# Patient Record
Sex: FEMALE | Race: BLACK OR AFRICAN AMERICAN | NOT HISPANIC OR LATINO | Employment: OTHER | URBAN - METROPOLITAN AREA
[De-identification: names, ages, dates, MRNs, and addresses within clinical notes are randomized per-mention and may not be internally consistent; named-entity substitution may affect disease eponyms.]

---

## 2023-02-16 PROBLEM — F22 PARANOID DELUSION (MULTI): Status: ACTIVE | Noted: 2023-02-16

## 2023-02-16 PROBLEM — E55.9 MILD VITAMIN D DEFICIENCY: Status: ACTIVE | Noted: 2023-02-16

## 2023-02-16 PROBLEM — R41.3 MEMORY LOSS: Status: ACTIVE | Noted: 2023-02-16

## 2023-02-16 PROBLEM — F32.0 DEPRESSION, MAJOR, SINGLE EPISODE, MILD (CMS-HCC): Status: ACTIVE | Noted: 2023-02-16

## 2023-02-16 PROBLEM — F02.818 DEMENTIA ASSOCIATED WITH OTHER UNDERLYING DISEASE WITH BEHAVIORAL DISTURBANCE (MULTI): Status: ACTIVE | Noted: 2023-02-16

## 2023-02-16 PROBLEM — E87.6 HYPOKALEMIA: Status: ACTIVE | Noted: 2023-02-16

## 2023-02-16 PROBLEM — R21 RASH: Status: ACTIVE | Noted: 2023-02-16

## 2023-02-16 PROBLEM — M54.2 NECK PAIN: Status: ACTIVE | Noted: 2023-02-16

## 2023-02-16 PROBLEM — G30.9 ALZHEIMER'S DISEASE (MULTI): Status: ACTIVE | Noted: 2023-02-16

## 2023-02-16 PROBLEM — E87.6 LOW SERUM POTASSIUM: Status: ACTIVE | Noted: 2023-02-16

## 2023-02-16 PROBLEM — R73.09 ABNORMAL BLOOD SUGAR: Status: ACTIVE | Noted: 2023-02-16

## 2023-02-16 PROBLEM — E43 SEVERE PROTEIN-CALORIE MALNUTRITION (MULTI): Status: ACTIVE | Noted: 2023-02-16

## 2023-02-16 PROBLEM — I10 HYPERTENSION: Status: ACTIVE | Noted: 2023-02-16

## 2023-02-16 PROBLEM — F02.80 ALZHEIMER'S DISEASE (MULTI): Status: ACTIVE | Noted: 2023-02-16

## 2023-02-16 PROBLEM — E78.00 ELEVATED LDL CHOLESTEROL LEVEL: Status: ACTIVE | Noted: 2023-02-16

## 2023-02-16 RX ORDER — AMLODIPINE BESYLATE 5 MG/1
1 TABLET ORAL
COMMUNITY
Start: 2022-07-26 | End: 2023-05-08 | Stop reason: SDUPTHER

## 2023-02-16 RX ORDER — MEMANTINE HYDROCHLORIDE 10 MG/1
1 TABLET ORAL 2 TIMES DAILY
COMMUNITY
Start: 2019-04-22 | End: 2023-05-08 | Stop reason: SDUPTHER

## 2023-02-16 RX ORDER — POTASSIUM CHLORIDE 20 MEQ/1
1 TABLET, EXTENDED RELEASE ORAL
COMMUNITY
Start: 2022-07-26 | End: 2023-03-14 | Stop reason: SINTOL

## 2023-02-16 RX ORDER — ROSUVASTATIN CALCIUM 10 MG/1
1 TABLET, COATED ORAL
COMMUNITY
Start: 2018-04-06 | End: 2023-05-08 | Stop reason: SDUPTHER

## 2023-02-16 RX ORDER — DONEPEZIL HYDROCHLORIDE 10 MG/1
1 TABLET, FILM COATED ORAL
COMMUNITY
Start: 2019-01-30 | End: 2023-05-08 | Stop reason: SDUPTHER

## 2023-03-14 ENCOUNTER — OFFICE VISIT (OUTPATIENT)
Dept: PRIMARY CARE | Facility: CLINIC | Age: 73
End: 2023-03-14
Payer: MEDICARE

## 2023-03-14 VITALS
WEIGHT: 104 LBS | HEIGHT: 63 IN | SYSTOLIC BLOOD PRESSURE: 119 MMHG | BODY MASS INDEX: 18.43 KG/M2 | HEART RATE: 66 BPM | DIASTOLIC BLOOD PRESSURE: 80 MMHG

## 2023-03-14 VITALS — HEART RATE: 106 BPM | DIASTOLIC BLOOD PRESSURE: 80 MMHG | SYSTOLIC BLOOD PRESSURE: 119 MMHG

## 2023-03-14 DIAGNOSIS — E87.6 LOW SERUM POTASSIUM: ICD-10-CM

## 2023-03-14 DIAGNOSIS — F02.80 ALZHEIMER'S DISEASE (MULTI): ICD-10-CM

## 2023-03-14 DIAGNOSIS — I10 PRIMARY HYPERTENSION: Primary | ICD-10-CM

## 2023-03-14 DIAGNOSIS — F32.0 DEPRESSION, MAJOR, SINGLE EPISODE, MILD (CMS-HCC): ICD-10-CM

## 2023-03-14 DIAGNOSIS — Z74.09 IMPAIRED FUNCTIONAL MOBILITY, BALANCE, GAIT, AND ENDURANCE: ICD-10-CM
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
## 2023-03-14 DIAGNOSIS — G30.9 ALZHEIMER'S DISEASE (MULTI): ICD-10-CM

## 2023-03-14 DIAGNOSIS — E43 SEVERE PROTEIN-CALORIE MALNUTRITION (MULTI): ICD-10-CM

## 2023-03-14 DIAGNOSIS — F02.818 DEMENTIA ASSOCIATED WITH OTHER UNDERLYING DISEASE WITH BEHAVIORAL DISTURBANCE (MULTI): ICD-10-CM

## 2023-03-14 PROCEDURE — 99496 TRANSJ CARE MGMT HIGH F2F 7D: CPT | Performed by: FAMILY MEDICINE

## 2023-03-14 PROCEDURE — 1159F MED LIST DOCD IN RCRD: CPT | Performed by: FAMILY MEDICINE

## 2023-03-14 PROCEDURE — 3074F SYST BP LT 130 MM HG: CPT | Performed by: FAMILY MEDICINE

## 2023-03-14 PROCEDURE — 3079F DIAST BP 80-89 MM HG: CPT | Performed by: FAMILY MEDICINE

## 2023-03-14 RX ORDER — POTASSIUM CHLORIDE 1.5 G/1.58G
20 POWDER, FOR SOLUTION ORAL 2 TIMES DAILY
Qty: 60 PACKET | Refills: 11 | Status: SHIPPED | OUTPATIENT
Start: 2023-03-14 | End: 2023-06-16 | Stop reason: SDUPTHER

## 2023-03-14 NOTE — PROGRESS NOTES
"Subjective   Patient ID: Juliann Bautista is a 72 y.o. female who presents for Hospital Follow-up (Fell downstairs was in nursing home   2023).  Today she is accompanied by accompanied by spouse.     Patient: Juliann Bautista  : 1950  PCP: Andrew Cagle MD  MRN: [id number]  Program: No linked episodes     Juliann Bautista is a 72 y.o. female presenting today for follow-up after being discharged from the hospital 7 days ago. The main problem requiring admission was fall down the starirs. The discharge summary and/or Transitional Care Management documentation was reviewed. Medication reconciliation was performed as indicated via the \"Caesar as Reviewed\" timestamp.     Juliann Bautista was contacted by Transitional Care Management services two days after her discharge. This encounter and supporting documentation was reviewed.    The complexity of medical decision making for this patient's transitional care is high.    Review of Systems    No family history on file.    No data recorded    No follow-ups on file.      JULIANN BAUTISTA is a 72 year old Female with past medical history of Alzheimer's, hypertension, hyperlipidemia, chronic malnutrition presents after falling down  14 stairs at home.  She is unable to provide any history, baseline is alert and oriented x0-1.  Her  is at bedside, who provides all history.  Per , they were in the living room, when he got up to grab something from another room, heard  a crash, and ran back, only to find her at the bottom of the basement stairs, which is right next to the living room.   believes that she tripped and fell down the stairs.  There was no loss of consciousness.  She was not complaining of any pain  after the injury initially.  She was brought to the [facility], where CT head/cspine/chest/abd/pelvis and XR of hand were all negative for  fractures.  She has a small right scalp hematoma.  's biggest concern is that over the last several " months, patient has lost interest in food or fluid intake.  He tries to encourage her to eat and drink, and has been giving her supplemental drinks,  but she often does not drink everything he would like her to.  Patient does not voice any concerns.  Per ED, patient was unable to ambulate.  Patient was sent to [facility] rehab patient was discharged from the [facility] last Wednesday patient states that has been was there every day and was providing majority of the care patient was receiving physical therapy is eating much better now       ROS . 14 systems reviewed and negative except as in HPI . Hospital and nursing notes reviewed.  History obtained from  patient unable to provide history very confused  Current Outpatient Medications on File Prior to Visit   Medication Sig Dispense Refill    amLODIPine (Norvasc) 5 mg tablet Take 1 tablet (5 mg) by mouth once every day.      donepezil (Aricept) 10 mg tablet Take 1 tablet (10 mg) by mouth once every day.      memantine (Namenda) 10 mg tablet Take 1 tablet (10 mg) by mouth in the morning and 1 tablet (10 mg) before bedtime.      MULTIVITAMIN ORAL Take 1 tablet by mouth once every day.      rosuvastatin (Crestor) 10 mg tablet Take 1 tablet (10 mg) by mouth once every day.      [DISCONTINUED] potassium chloride CR (Klor-Con M20) 20 mEq ER tablet Take 1 tablet (20 mEq) by mouth once every day.       No current facility-administered medications on file prior to visit.        Review of Systems    Objective   /80   Pulse 106   BSA: There is no height or weight on file to calculate BSA.  Growth percentiles: Facility age limit for growth %rosalva is 20 years. Facility age limit for growth %rosalva is 20 years.   No visits with results within 1 Week(s) from this visit.   Latest known visit with results is:   Legacy Encounter on 07/25/2022   Component Date Value Ref Range Status    Cholesterol 07/25/2022 133  0 - 199 mg/dL Final    Comment: .      AGE       DESIRABLE   BORDERLINE HIGH   HIGH     0-19 Y     0 - 169       170 - 199     >/= 200    20-24 Y     0 - 189       190 - 224     >/= 225         >24 Y     0 - 199       200 - 239     >/= 240   **All ranges are based on fasting samples. Specific   therapeutic targets will vary based on patient-specific   cardiac risk.  .   Pediatric guidelines reference:Pediatrics 2011, 128(S5).   Adult guidelines reference: NCEP ATPIII Guidelines,     TOM 2001, 258:2486-97  .   Venipuncture immediately after or during the    administration of Metamizole may lead to falsely   low results. Testing should be performed immediately   prior to Metamizole dosing.      HDL 07/25/2022 48.7  mg/dL Final    Comment: .      AGE      VERY LOW   LOW     NORMAL    HIGH       0-19 Y       < 35   < 40     40-45     ----    20-24 Y       ----   < 40       >45     ----      >24 Y       ----   < 40     40-60      >60  .      Cholesterol/HDL Ratio 07/25/2022 2.7   Final    Comment: REF VALUES  DESIRABLE  < 3.4  HIGH RISK  > 5.0      LDL 07/25/2022 70  0 - 99 mg/dL Final    Comment: .                           NEAR      BORD      AGE      DESIRABLE  OPTIMAL    HIGH     HIGH     VERY HIGH     0-19 Y     0 - 109     ---    110-129   >/= 130     ----    20-24 Y     0 - 119     ---    120-159   >/= 160     ----      >24 Y     0 -  99   100-129  130-159   160-189     >/=190  .      VLDL 07/25/2022 15  0 - 40 mg/dL Final    Triglycerides 07/25/2022 74  0 - 149 mg/dL Final    Comment: .      AGE      DESIRABLE   BORDERLINE HIGH   HIGH     VERY HIGH   0 D-90 D    19 - 174         ----         ----        ----  91 D- 9 Y     0 -  74        75 -  99     >/= 100      ----    10-19 Y     0 -  89        90 - 129     >/= 130      ----    20-24 Y     0 - 114       115 - 149     >/= 150      ----         >24 Y     0 - 149       150 - 199    200- 499    >/= 500  .   Venipuncture immediately after or during the    administration of Metamizole may lead to falsely   low  results. Testing should be performed immediately   prior to Metamizole dosing.      Vitamin B-12 07/25/2022 727  211 - 911 pg/mL Final    Glucose 07/25/2022 107 (H)  74 - 99 mg/dL Final    Sodium 07/25/2022 141  136 - 145 mmol/L Final    Potassium 07/25/2022 3.0 (L)  3.5 - 5.3 mmol/L Final    Chloride 07/25/2022 101  98 - 107 mmol/L Final    Bicarbonate 07/25/2022 29  21 - 32 mmol/L Final    Anion Gap 07/25/2022 14  10 - 20 mmol/L Final    Urea Nitrogen 07/25/2022 9  6 - 23 mg/dL Final    Creatinine 07/25/2022 0.81  0.50 - 1.05 mg/dL Final    GFR Female 07/25/2022 77  >90 mL/min/1.73m2 Final    Comment:  CALCULATIONS OF ESTIMATED GFR ARE PERFORMED   USING THE 2021 CKD-EPI STUDY REFIT EQUATION   WITHOUT THE RACE VARIABLE FOR THE IDMS-TRACEABLE   CREATININE METHODS.    https://jasn.asnjournals.org/content/early/2021/09/22/ASN.5281292154      Calcium 07/25/2022 9.0  8.6 - 10.3 mg/dL Final    Albumin 07/25/2022 3.9  3.4 - 5.0 g/dL Final    Alkaline Phosphatase 07/25/2022 118  33 - 136 U/L Final    Total Protein 07/25/2022 6.5  6.4 - 8.2 g/dL Final    AST 07/25/2022 18  9 - 39 U/L Final    Total Bilirubin 07/25/2022 0.4  0.0 - 1.2 mg/dL Final    ALT (SGPT) 07/25/2022 19  7 - 45 U/L Final    Comment:  Patients treated with Sulfasalazine may generate    falsely decreased results for ALT.      WBC 07/25/2022 8.3  4.4 - 11.3 x10E9/L Final    RBC 07/25/2022 4.84  4.00 - 5.20 x10E12/L Final    Hemoglobin 07/25/2022 13.8  12.0 - 16.0 g/dL Final    Hematocrit 07/25/2022 43.0  36.0 - 46.0 % Final    MCV 07/25/2022 89  80 - 100 fL Final    MCHC 07/25/2022 32.1  32.0 - 36.0 g/dL Final    Platelets 07/25/2022 327  150 - 450 x10E9/L Final    RDW 07/25/2022 13.9  11.5 - 14.5 % Final    Vitamin D, 25-Hydroxy 07/25/2022 30  ng/mL Final    Comment: .  DEFICIENCY:         < 20   NG/ML  INSUFFICIENCY:      20-29  NG/ML  SUFFICIENCY:         NG/ML    THIS ASSAY ACCURATELY QUANTIFIES THE SUM OF  VITAMIN D3, 25-HYDROXY AND VIT  D2,25-HYDROXY.        Physical Exam  Constitutional:       Comments: Confused   HENT:      Head: Normocephalic and atraumatic.   Cardiovascular:      Rate and Rhythm: Normal rate and regular rhythm.   Pulmonary:      Effort: Pulmonary effort is normal.      Breath sounds: Normal breath sounds.   Musculoskeletal:         General: Normal range of motion.      Cervical back: Normal range of motion.   Skin:     General: Skin is warm and dry.   Neurological:      Mental Status: She is disoriented.      Gait: Gait abnormal.   Psychiatric:      Comments: Confused and oriented to self only         Assessment/Plan     [unfilled]    Assessment/Plan

## 2023-03-14 NOTE — PROGRESS NOTES
"Patient: Juliann Bautista  : 1950  PCP: Andrew Cagle MD  MRN: [id number]  Program: No linked episodes     Juliann Bautista is a 72 y.o. female presenting today for follow-up after being discharged from the hospital {Numbers; 1-14:24642} days ago. The main problem requiring admission was ***. The discharge summary and/or Transitional Care Management documentation was reviewed. Medication reconciliation was performed as indicated via the \"Caesar as Reviewed\" timestamp.     Juliann Bautista was contacted by Transitional Care Management services two days after her discharge. This encounter and supporting documentation was reviewed.    The complexity of medical decision making for this patient's transitional care is {DESC; MODERATE/HIGH:08564}.    Review of Systems    No family history on file.    No data recorded    No follow-ups on file.  "
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
## 2023-03-15 PROBLEM — Z74.09 IMPAIRED FUNCTIONAL MOBILITY, BALANCE, GAIT, AND ENDURANCE: Status: ACTIVE | Noted: 2023-03-15

## 2023-03-15 NOTE — ASSESSMENT & PLAN NOTE
Patient lives boost insurance not paying for boost right now we will continue to try to get boost covered by insurance as it is expensive for the  patient has very poor p.o. intake otherwise

## 2023-03-30 ENCOUNTER — APPOINTMENT (OUTPATIENT)
Dept: PRIMARY CARE | Facility: CLINIC | Age: 73
End: 2023-03-30
Payer: MEDICARE

## 2023-06-15 ENCOUNTER — OFFICE VISIT (OUTPATIENT)
Dept: PRIMARY CARE | Facility: CLINIC | Age: 73
End: 2023-06-15
Payer: MEDICARE

## 2023-06-15 VITALS
BODY MASS INDEX: 17.89 KG/M2 | DIASTOLIC BLOOD PRESSURE: 75 MMHG | HEIGHT: 63 IN | SYSTOLIC BLOOD PRESSURE: 109 MMHG | WEIGHT: 101 LBS

## 2023-06-15 DIAGNOSIS — R41.3 MEMORY LOSS: ICD-10-CM

## 2023-06-15 DIAGNOSIS — E78.00 PURE HYPERCHOLESTEROLEMIA: ICD-10-CM

## 2023-06-15 DIAGNOSIS — Z13.89 SCREENING FOR MULTIPLE CONDITIONS: ICD-10-CM

## 2023-06-15 DIAGNOSIS — E43 SEVERE PROTEIN-CALORIE MALNUTRITION (MULTI): ICD-10-CM

## 2023-06-15 DIAGNOSIS — E55.9 MILD VITAMIN D DEFICIENCY: ICD-10-CM

## 2023-06-15 DIAGNOSIS — Z00.00 ROUTINE GENERAL MEDICAL EXAMINATION AT HEALTH CARE FACILITY: ICD-10-CM

## 2023-06-15 DIAGNOSIS — I10 PRIMARY HYPERTENSION: Primary | ICD-10-CM

## 2023-06-15 DIAGNOSIS — E86.0 DEHYDRATION: ICD-10-CM
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
## 2023-06-15 PROBLEM — E78.5 HYPERLIPIDEMIA, UNSPECIFIED: Status: ACTIVE | Noted: 2023-02-20

## 2023-06-15 PROBLEM — Z91.199 NONCOMPLIANCE WITH TREATMENT: Status: RESOLVED | Noted: 2023-06-15 | Resolved: 2023-06-15

## 2023-06-15 LAB
ALANINE AMINOTRANSFERASE (SGPT) (U/L) IN SER/PLAS: 16 U/L (ref 7–45)
ALBUMIN (G/DL) IN SER/PLAS: 3.7 G/DL (ref 3.4–5)
ALKALINE PHOSPHATASE (U/L) IN SER/PLAS: 153 U/L (ref 33–136)
ANION GAP IN SER/PLAS: 13 MMOL/L (ref 10–20)
ASPARTATE AMINOTRANSFERASE (SGOT) (U/L) IN SER/PLAS: 17 U/L (ref 9–39)
BILIRUBIN TOTAL (MG/DL) IN SER/PLAS: 0.3 MG/DL (ref 0–1.2)
CALCIUM (MG/DL) IN SER/PLAS: 9.2 MG/DL (ref 8.6–10.6)
CARBON DIOXIDE, TOTAL (MMOL/L) IN SER/PLAS: 29 MMOL/L (ref 21–32)
CHLORIDE (MMOL/L) IN SER/PLAS: 108 MMOL/L (ref 98–107)
CHOLESTEROL (MG/DL) IN SER/PLAS: 144 MG/DL (ref 0–199)
CHOLESTEROL IN HDL (MG/DL) IN SER/PLAS: 53.1 MG/DL
CHOLESTEROL/HDL RATIO: 2.7
COBALAMIN (VITAMIN B12) (PG/ML) IN SER/PLAS: 845 PG/ML (ref 211–911)
CREATININE (MG/DL) IN SER/PLAS: 0.64 MG/DL (ref 0.5–1.05)
ERYTHROCYTE DISTRIBUTION WIDTH (RATIO) BY AUTOMATED COUNT: 13.8 % (ref 11.5–14.5)
ERYTHROCYTE MEAN CORPUSCULAR HEMOGLOBIN CONCENTRATION (G/DL) BY AUTOMATED: 31.6 G/DL (ref 32–36)
ERYTHROCYTE MEAN CORPUSCULAR VOLUME (FL) BY AUTOMATED COUNT: 92 FL (ref 80–100)
ERYTHROCYTES (10*6/UL) IN BLOOD BY AUTOMATED COUNT: 4.46 X10E12/L (ref 4–5.2)
FOLATE (NG/ML) IN SER/PLAS: 12.5 NG/ML
GFR FEMALE: >90 ML/MIN/1.73M2
GLUCOSE (MG/DL) IN SER/PLAS: 105 MG/DL (ref 74–99)
HEMATOCRIT (%) IN BLOOD BY AUTOMATED COUNT: 41.1 % (ref 36–46)
HEMOGLOBIN (G/DL) IN BLOOD: 13 G/DL (ref 12–16)
LDL: 77 MG/DL (ref 0–99)
LEUKOCYTES (10*3/UL) IN BLOOD BY AUTOMATED COUNT: 6.6 X10E9/L (ref 4.4–11.3)
NRBC (PER 100 WBCS) BY AUTOMATED COUNT: 0 /100 WBC (ref 0–0)
PLATELETS (10*3/UL) IN BLOOD AUTOMATED COUNT: 342 X10E9/L (ref 150–450)
POTASSIUM (MMOL/L) IN SER/PLAS: 3.4 MMOL/L (ref 3.5–5.3)
PROTEIN TOTAL: 6.6 G/DL (ref 6.4–8.2)
SODIUM (MMOL/L) IN SER/PLAS: 147 MMOL/L (ref 136–145)
THYROTROPIN (MIU/L) IN SER/PLAS BY DETECTION LIMIT <= 0.05 MIU/L: 0.81 MIU/L (ref 0.44–3.98)
TRIGLYCERIDE (MG/DL) IN SER/PLAS: 69 MG/DL (ref 0–149)
UREA NITROGEN (MG/DL) IN SER/PLAS: 9 MG/DL (ref 6–23)
VLDL: 14 MG/DL (ref 0–40)

## 2023-06-15 PROCEDURE — 1159F MED LIST DOCD IN RCRD: CPT | Performed by: FAMILY MEDICINE

## 2023-06-15 PROCEDURE — 85027 COMPLETE CBC AUTOMATED: CPT

## 2023-06-15 PROCEDURE — 1036F TOBACCO NON-USER: CPT | Performed by: FAMILY MEDICINE

## 2023-06-15 PROCEDURE — 99214 OFFICE O/P EST MOD 30 MIN: CPT | Performed by: FAMILY MEDICINE

## 2023-06-15 PROCEDURE — 82607 VITAMIN B-12: CPT

## 2023-06-15 PROCEDURE — 1170F FXNL STATUS ASSESSED: CPT | Performed by: FAMILY MEDICINE

## 2023-06-15 PROCEDURE — 82746 ASSAY OF FOLIC ACID SERUM: CPT

## 2023-06-15 PROCEDURE — G0439 PPPS, SUBSEQ VISIT: HCPCS | Performed by: FAMILY MEDICINE

## 2023-06-15 PROCEDURE — 84443 ASSAY THYROID STIM HORMONE: CPT

## 2023-06-15 PROCEDURE — G0444 DEPRESSION SCREEN ANNUAL: HCPCS | Performed by: FAMILY MEDICINE

## 2023-06-15 PROCEDURE — 3008F BODY MASS INDEX DOCD: CPT | Performed by: FAMILY MEDICINE

## 2023-06-15 PROCEDURE — 80061 LIPID PANEL: CPT

## 2023-06-15 PROCEDURE — 3078F DIAST BP <80 MM HG: CPT | Performed by: FAMILY MEDICINE

## 2023-06-15 PROCEDURE — 3074F SYST BP LT 130 MM HG: CPT | Performed by: FAMILY MEDICINE

## 2023-06-15 PROCEDURE — 80053 COMPREHEN METABOLIC PANEL: CPT

## 2023-06-15 ASSESSMENT — ENCOUNTER SYMPTOMS
LOSS OF SENSATION IN FEET: 0
OCCASIONAL FEELINGS OF UNSTEADINESS: 1
DEPRESSION: 0

## 2023-06-15 ASSESSMENT — ACTIVITIES OF DAILY LIVING (ADL)
TAKING_MEDICATION: TOTAL CARE
BATHING: NEEDS ASSISTANCE
DOING_HOUSEWORK: TOTAL CARE
MANAGING_FINANCES: TOTAL CARE
GROCERY_SHOPPING: TOTAL CARE
DRESSING: NEEDS ASSISTANCE

## 2023-06-15 ASSESSMENT — PATIENT HEALTH QUESTIONNAIRE - PHQ9: 2. FEELING DOWN, DEPRESSED OR HOPELESS: NOT AT ALL

## 2023-06-16 ENCOUNTER — TELEPHONE (OUTPATIENT)
Dept: PRIMARY CARE | Facility: CLINIC | Age: 73
End: 2023-06-16
Payer: MEDICARE

## 2023-06-16 DIAGNOSIS — I10 PRIMARY HYPERTENSION: ICD-10-CM

## 2023-06-16 RX ORDER — POTASSIUM CHLORIDE 1.5 G/1.58G
20 POWDER, FOR SOLUTION ORAL 3 TIMES DAILY
Qty: 90 PACKET | Refills: 1 | Status: SHIPPED | OUTPATIENT
Start: 2023-06-16 | End: 2024-03-08 | Stop reason: SDUPTHER

## 2023-06-17 NOTE — PROGRESS NOTES
"Subjective   Reason for Visit: Juliann Bautista is an 72 y.o. female here for a Medicare Wellness visit.               PatientHe is here room has been doing slightly better cognition progressively worseHas not been eatingHas been has been able to maintainSchedule    Eye Problem         Patient Care Team:  Andrew Cagle MD as PCP - General     Review of Systems  ROS . 14 systems reviewed and negative except as in HPI . HConfusedObjective   Vitals:  Blood Pressure 109/75   Height 1.588 m (5' 2.5\")   Weight 45.8 kg (101 lb)   Body Mass Index 18.18 kg/m²       Physical Exam  Physical Exam   Head AT/NC HEENT Tympanic membranes normal Mucosas normal Heart RRR  Lungs CTA Abdomen soft NT/ND Ext no edema.   Oriented to self    Assessment/Plan   Problem List Items Addressed This Visit       Hypertension - Primary    Relevant Orders    Comprehensive Metabolic Panel (Completed)    CBC (Completed)    Folate (Completed)    Memory loss    Relevant Orders    Urinalysis with Reflex Microscopic and Culture    Vitamin B12 (Completed)    TSH with reflex to Free T4 if abnormal (Completed)    Comprehensive Metabolic Panel (Completed)    CBC (Completed)    Folate (Completed)    Mild vitamin D deficiency    Relevant Orders    Comprehensive Metabolic Panel (Completed)    CBC (Completed)    Severe protein-calorie malnutrition (CMS/HCC)    Relevant Orders    TSH with reflex to Free T4 if abnormal (Completed)    Comprehensive Metabolic Panel (Completed)    CBC (Completed)    Dehydration    Relevant Orders    Urinalysis with Reflex Microscopic and Culture    TSH with reflex to Free T4 if abnormal (Completed)    Comprehensive Metabolic Panel (Completed)    CBC (Completed)    Hyperlipidemia, unspecified    Relevant Orders    Lipid Panel (Completed)    CBC (Completed)    spent 15 minutes obtaining and discussing depression screening using PHQ-2 questions with results documented in chart.”  (If screen positive: \"The screen indicated potential " depression and PHQ-9 was obtained with treatment and referral plan discussed

## 2023-06-21 ENCOUNTER — CLINICAL SUPPORT (OUTPATIENT)
Dept: PRIMARY CARE | Facility: CLINIC | Age: 73
End: 2023-06-21
Payer: MEDICARE

## 2023-06-21 DIAGNOSIS — R39.9 UTI SYMPTOMS: ICD-10-CM

## 2023-06-21 LAB
APPEARANCE, URINE: ABNORMAL
BILIRUBIN, URINE: NEGATIVE
BLOOD, URINE: NEGATIVE
BUDDING YEAST, URINE: PRESENT /HPF
COLOR, URINE: YELLOW
GLUCOSE, URINE: NEGATIVE MG/DL
KETONES, URINE: NEGATIVE MG/DL
LEUKOCYTE ESTERASE, URINE: ABNORMAL
MUCUS, URINE: ABNORMAL /LPF
NITRITE, URINE: NEGATIVE
PH, URINE: 8 (ref 5–8)
PROTEIN, URINE: NEGATIVE MG/DL
RBC, URINE: 1 /HPF (ref 0–5)
SPECIFIC GRAVITY, URINE: 1.02 (ref 1–1.03)
SQUAMOUS EPITHELIAL CELLS, URINE: 1 /HPF
UROBILINOGEN, URINE: <2 MG/DL (ref 0–1.9)
WBC, URINE: 12 /HPF (ref 0–5)

## 2023-06-21 PROCEDURE — 87086 URINE CULTURE/COLONY COUNT: CPT

## 2023-06-21 PROCEDURE — 81001 URINALYSIS AUTO W/SCOPE: CPT

## 2023-06-22 LAB — URINE CULTURE: NORMAL

## 2023-08-21 ENCOUNTER — TELEPHONE (OUTPATIENT)
Dept: PRIMARY CARE | Facility: CLINIC | Age: 73
End: 2023-08-21
Payer: MEDICARE

## 2023-08-21 DIAGNOSIS — F02.818 DEMENTIA ASSOCIATED WITH OTHER UNDERLYING DISEASE WITH BEHAVIORAL DISTURBANCE (MULTI): Primary | ICD-10-CM

## 2023-09-06 DIAGNOSIS — I10 PRIMARY HYPERTENSION: ICD-10-CM

## 2023-09-06 DIAGNOSIS — F32.0 DEPRESSION, MAJOR, SINGLE EPISODE, MILD (CMS-HCC): ICD-10-CM

## 2023-09-06 DIAGNOSIS — F02.818 DEMENTIA ASSOCIATED WITH OTHER UNDERLYING DISEASE WITH BEHAVIORAL DISTURBANCE (MULTI): ICD-10-CM

## 2023-09-07 RX ORDER — MEMANTINE HYDROCHLORIDE 10 MG/1
10 TABLET ORAL 2 TIMES DAILY
Qty: 180 TABLET | Refills: 1 | Status: SHIPPED | OUTPATIENT
Start: 2023-09-07 | End: 2023-09-21 | Stop reason: SDUPTHER

## 2023-09-07 RX ORDER — MEMANTINE HYDROCHLORIDE 10 MG/1
10 TABLET ORAL 2 TIMES DAILY
Qty: 90 TABLET | Refills: 0 | Status: SHIPPED | OUTPATIENT
Start: 2023-09-07 | End: 2023-09-21 | Stop reason: ALTCHOICE

## 2023-09-18 ENCOUNTER — HOSPITAL ENCOUNTER (INPATIENT)
Dept: DATA CONVERSION | Facility: HOSPITAL | Age: 73
LOS: 9 days | Discharge: HOME | End: 2023-09-27
Attending: FAMILY MEDICINE | Admitting: FAMILY MEDICINE
Payer: MEDICARE

## 2023-09-18 DIAGNOSIS — N39.0 URINARY TRACT INFECTION, SITE NOT SPECIFIED: ICD-10-CM

## 2023-09-18 LAB
ANION GAP IN SER/PLAS: 13 MMOL/L (ref 10–20)
ANION GAP IN SER/PLAS: NORMAL
CALCIUM (MG/DL) IN SER/PLAS: 8.2 MG/DL (ref 8.6–10.3)
CALCIUM (MG/DL) IN SER/PLAS: NORMAL
CARBON DIOXIDE, TOTAL (MMOL/L) IN SER/PLAS: 24 MMOL/L (ref 21–32)
CARBON DIOXIDE, TOTAL (MMOL/L) IN SER/PLAS: NORMAL
CHLORIDE (MMOL/L) IN SER/PLAS: 103 MMOL/L (ref 98–107)
CHLORIDE (MMOL/L) IN SER/PLAS: NORMAL
CREATININE (MG/DL) IN SER/PLAS: 0.57 MG/DL (ref 0.5–1.05)
CREATININE (MG/DL) IN SER/PLAS: NORMAL
ERYTHROCYTE DISTRIBUTION WIDTH (RATIO) BY AUTOMATED COUNT: 13.3 % (ref 11.5–14.5)
ERYTHROCYTE DISTRIBUTION WIDTH (RATIO) BY AUTOMATED COUNT: NORMAL
ERYTHROCYTE MEAN CORPUSCULAR HEMOGLOBIN CONCENTRATION (G/DL) BY AUTOMATED: 33.2 G/DL (ref 32–36)
ERYTHROCYTE MEAN CORPUSCULAR HEMOGLOBIN CONCENTRATION (G/DL) BY AUTOMATED: NORMAL
ERYTHROCYTE MEAN CORPUSCULAR VOLUME (FL) BY AUTOMATED COUNT: 88 FL (ref 80–100)
ERYTHROCYTE MEAN CORPUSCULAR VOLUME (FL) BY AUTOMATED COUNT: NORMAL
ERYTHROCYTES (10*6/UL) IN BLOOD BY AUTOMATED COUNT: 4.9 X10E12/L (ref 4–5.2)
ERYTHROCYTES (10*6/UL) IN BLOOD BY AUTOMATED COUNT: NORMAL
GFR FEMALE: >90 ML/MIN/1.73M2
GFR FEMALE: NORMAL
GFR MALE: NORMAL
GLUCOSE (MG/DL) IN SER/PLAS: 117 MG/DL (ref 74–99)
GLUCOSE (MG/DL) IN SER/PLAS: NORMAL
HEMATOCRIT (%) IN BLOOD BY AUTOMATED COUNT: 43.1 % (ref 36–46)
HEMATOCRIT (%) IN BLOOD BY AUTOMATED COUNT: NORMAL
HEMOGLOBIN (G/DL) IN BLOOD: 14.3 G/DL (ref 12–16)
HEMOGLOBIN (G/DL) IN BLOOD: NORMAL
LEUKOCYTES (10*3/UL) IN BLOOD BY AUTOMATED COUNT: 8.8 X10E9/L (ref 4.4–11.3)
LEUKOCYTES (10*3/UL) IN BLOOD BY AUTOMATED COUNT: NORMAL
NRBC (PER 100 WBCS) BY AUTOMATED COUNT: NORMAL
PLATELETS (10*3/UL) IN BLOOD AUTOMATED COUNT: 298 X10E9/L (ref 150–450)
PLATELETS (10*3/UL) IN BLOOD AUTOMATED COUNT: NORMAL
POTASSIUM (MMOL/L) IN SER/PLAS: 3.5 MMOL/L (ref 3.5–5.3)
POTASSIUM (MMOL/L) IN SER/PLAS: NORMAL
SODIUM (MMOL/L) IN SER/PLAS: 136 MMOL/L (ref 136–145)
SODIUM (MMOL/L) IN SER/PLAS: NORMAL
UREA NITROGEN (MG/DL) IN SER/PLAS: 6 MG/DL (ref 6–23)
UREA NITROGEN (MG/DL) IN SER/PLAS: NORMAL

## 2023-09-19 LAB
ANION GAP IN SER/PLAS: 13 MMOL/L (ref 10–20)
CALCIUM (MG/DL) IN SER/PLAS: 8 MG/DL (ref 8.6–10.3)
CARBON DIOXIDE, TOTAL (MMOL/L) IN SER/PLAS: 24 MMOL/L (ref 21–32)
CHLORIDE (MMOL/L) IN SER/PLAS: 104 MMOL/L (ref 98–107)
CREATININE (MG/DL) IN SER/PLAS: 0.49 MG/DL (ref 0.5–1.05)
ERYTHROCYTE DISTRIBUTION WIDTH (RATIO) BY AUTOMATED COUNT: 13.4 % (ref 11.5–14.5)
ERYTHROCYTE MEAN CORPUSCULAR HEMOGLOBIN CONCENTRATION (G/DL) BY AUTOMATED: 33.3 G/DL (ref 32–36)
ERYTHROCYTE MEAN CORPUSCULAR VOLUME (FL) BY AUTOMATED COUNT: 89 FL (ref 80–100)
ERYTHROCYTES (10*6/UL) IN BLOOD BY AUTOMATED COUNT: 4.61 X10E12/L (ref 4–5.2)
GFR FEMALE: >90 ML/MIN/1.73M2
GLUCOSE (MG/DL) IN SER/PLAS: 96 MG/DL (ref 74–99)
HEMATOCRIT (%) IN BLOOD BY AUTOMATED COUNT: 40.8 % (ref 36–46)
HEMOGLOBIN (G/DL) IN BLOOD: 13.6 G/DL (ref 12–16)
LEUKOCYTES (10*3/UL) IN BLOOD BY AUTOMATED COUNT: 6.6 X10E9/L (ref 4.4–11.3)
PLATELETS (10*3/UL) IN BLOOD AUTOMATED COUNT: 263 X10E9/L (ref 150–450)
POTASSIUM (MMOL/L) IN SER/PLAS: 3.7 MMOL/L (ref 3.5–5.3)
SODIUM (MMOL/L) IN SER/PLAS: 137 MMOL/L (ref 136–145)
UREA NITROGEN (MG/DL) IN SER/PLAS: 4 MG/DL (ref 6–23)

## 2023-09-20 LAB
ANION GAP IN SER/PLAS: 13 MMOL/L (ref 10–20)
CALCIUM (MG/DL) IN SER/PLAS: 8.4 MG/DL (ref 8.6–10.3)
CARBON DIOXIDE, TOTAL (MMOL/L) IN SER/PLAS: 25 MMOL/L (ref 21–32)
CHLORIDE (MMOL/L) IN SER/PLAS: 103 MMOL/L (ref 98–107)
COBALAMIN (VITAMIN B12) (PG/ML) IN SER/PLAS: 693 PG/ML (ref 211–911)
CREATININE (MG/DL) IN SER/PLAS: 0.6 MG/DL (ref 0.5–1.05)
ERYTHROCYTE DISTRIBUTION WIDTH (RATIO) BY AUTOMATED COUNT: 13 % (ref 11.5–14.5)
ERYTHROCYTE MEAN CORPUSCULAR HEMOGLOBIN CONCENTRATION (G/DL) BY AUTOMATED: 33.7 G/DL (ref 32–36)
ERYTHROCYTE MEAN CORPUSCULAR VOLUME (FL) BY AUTOMATED COUNT: 86 FL (ref 80–100)
ERYTHROCYTES (10*6/UL) IN BLOOD BY AUTOMATED COUNT: 4.74 X10E12/L (ref 4–5.2)
FOLATE (NG/ML) IN SER/PLAS: 9.1 NG/ML
GFR FEMALE: >90 ML/MIN/1.73M2
GLUCOSE (MG/DL) IN SER/PLAS: 95 MG/DL (ref 74–99)
HEMATOCRIT (%) IN BLOOD BY AUTOMATED COUNT: 40.7 % (ref 36–46)
HEMOGLOBIN (G/DL) IN BLOOD: 13.7 G/DL (ref 12–16)
LEUKOCYTES (10*3/UL) IN BLOOD BY AUTOMATED COUNT: 8 X10E9/L (ref 4.4–11.3)
PLATELETS (10*3/UL) IN BLOOD AUTOMATED COUNT: 271 X10E9/L (ref 150–450)
POTASSIUM (MMOL/L) IN SER/PLAS: 3.2 MMOL/L (ref 3.5–5.3)
SODIUM (MMOL/L) IN SER/PLAS: 138 MMOL/L (ref 136–145)
UREA NITROGEN (MG/DL) IN SER/PLAS: 5 MG/DL (ref 6–23)

## 2023-09-21 ENCOUNTER — APPOINTMENT (OUTPATIENT)
Dept: PRIMARY CARE | Facility: CLINIC | Age: 73
End: 2023-09-21
Payer: MEDICARE

## 2023-09-21 DIAGNOSIS — F32.0 DEPRESSION, MAJOR, SINGLE EPISODE, MILD (CMS-HCC): ICD-10-CM

## 2023-09-21 DIAGNOSIS — F02.818 DEMENTIA ASSOCIATED WITH OTHER UNDERLYING DISEASE WITH BEHAVIORAL DISTURBANCE (MULTI): ICD-10-CM

## 2023-09-21 DIAGNOSIS — I10 PRIMARY HYPERTENSION: ICD-10-CM

## 2023-09-21 LAB
ANION GAP IN SER/PLAS: 17 MMOL/L (ref 10–20)
APPEARANCE, URINE: CLEAR
APPEARANCE, URINE: NORMAL
ASCORBIC ACID: NORMAL MG/DL
BACTERIA, URINE: ABNORMAL /HPF
BILIRUBIN, URINE: NEGATIVE
BILIRUBIN, URINE: NORMAL
BLOOD, URINE: ABNORMAL
BLOOD, URINE: NORMAL
CALCIUM (MG/DL) IN SER/PLAS: 8.2 MG/DL (ref 8.6–10.3)
CARBON DIOXIDE, TOTAL (MMOL/L) IN SER/PLAS: 22 MMOL/L (ref 21–32)
CHLORIDE (MMOL/L) IN SER/PLAS: 102 MMOL/L (ref 98–107)
COLOR, URINE: ABNORMAL
COLOR, URINE: NORMAL
CREATININE (MG/DL) IN SER/PLAS: 0.41 MG/DL (ref 0.5–1.05)
GFR FEMALE: >90 ML/MIN/1.73M2
GLUCOSE (MG/DL) IN SER/PLAS: 93 MG/DL (ref 74–99)
GLUCOSE, URINE: NEGATIVE MG/DL
GLUCOSE, URINE: NORMAL
KETONES, URINE: ABNORMAL MG/DL
KETONES, URINE: NORMAL
LEUKOCYTE ESTERASE, URINE: NEGATIVE
LEUKOCYTE ESTERASE, URINE: NORMAL
MAGNESIUM (MG/DL) IN SER/PLAS: 2 MG/DL (ref 1.6–2.4)
NITRITE, URINE: NEGATIVE
NITRITE, URINE: NORMAL
PH, URINE: 7 (ref 5–8)
PH, URINE: NORMAL
POCT GLUCOSE: 94 MG/DL (ref 74–99)
POTASSIUM (MMOL/L) IN SER/PLAS: 4 MMOL/L (ref 3.5–5.3)
PROTEIN, URINE: NEGATIVE MG/DL
PROTEIN, URINE: NORMAL
RBC, URINE: 2 /HPF (ref 0–5)
SODIUM (MMOL/L) IN SER/PLAS: 137 MMOL/L (ref 136–145)
SPECIFIC GRAVITY, URINE: 1.01 (ref 1–1.03)
SPECIFIC GRAVITY, URINE: NORMAL
SQUAMOUS EPITHELIAL CELLS, URINE: <1 /HPF
UREA NITROGEN (MG/DL) IN SER/PLAS: 3 MG/DL (ref 6–23)
UROBILINOGEN, URINE: <2 MG/DL (ref 0–1.9)
UROBILINOGEN, URINE: NORMAL
WBC, URINE: <1 /HPF (ref 0–5)

## 2023-09-21 RX ORDER — MEMANTINE HYDROCHLORIDE 10 MG/1
10 TABLET ORAL 2 TIMES DAILY
Qty: 180 TABLET | Refills: 1 | Status: SHIPPED | OUTPATIENT
Start: 2023-09-21 | End: 2024-03-08 | Stop reason: SDUPTHER

## 2023-09-21 RX ORDER — MEMANTINE HYDROCHLORIDE 10 MG/1
10 TABLET ORAL 2 TIMES DAILY
Qty: 90 TABLET | Refills: 0 | Status: SHIPPED | OUTPATIENT
Start: 2023-09-21

## 2023-09-22 LAB
ANION GAP IN SER/PLAS: 12 MMOL/L (ref 10–20)
CALCIUM (MG/DL) IN SER/PLAS: 8.8 MG/DL (ref 8.6–10.3)
CARBON DIOXIDE, TOTAL (MMOL/L) IN SER/PLAS: 27 MMOL/L (ref 21–32)
CHLORIDE (MMOL/L) IN SER/PLAS: 101 MMOL/L (ref 98–107)
CREATININE (MG/DL) IN SER/PLAS: 0.53 MG/DL (ref 0.5–1.05)
ERYTHROCYTE DISTRIBUTION WIDTH (RATIO) BY AUTOMATED COUNT: 13 % (ref 11.5–14.5)
ERYTHROCYTE MEAN CORPUSCULAR HEMOGLOBIN CONCENTRATION (G/DL) BY AUTOMATED: 33.6 G/DL (ref 32–36)
ERYTHROCYTE MEAN CORPUSCULAR VOLUME (FL) BY AUTOMATED COUNT: 86 FL (ref 80–100)
ERYTHROCYTES (10*6/UL) IN BLOOD BY AUTOMATED COUNT: 5.08 X10E12/L (ref 4–5.2)
GFR FEMALE: >90 ML/MIN/1.73M2
GLUCOSE (MG/DL) IN SER/PLAS: 121 MG/DL (ref 74–99)
HEMATOCRIT (%) IN BLOOD BY AUTOMATED COUNT: 43.7 % (ref 36–46)
HEMOGLOBIN (G/DL) IN BLOOD: 14.7 G/DL (ref 12–16)
LEUKOCYTES (10*3/UL) IN BLOOD BY AUTOMATED COUNT: 8.8 X10E9/L (ref 4.4–11.3)
PLATELETS (10*3/UL) IN BLOOD AUTOMATED COUNT: 304 X10E9/L (ref 150–450)
POTASSIUM (MMOL/L) IN SER/PLAS: 3 MMOL/L (ref 3.5–5.3)
SODIUM (MMOL/L) IN SER/PLAS: 137 MMOL/L (ref 136–145)
THYROTROPIN (MIU/L) IN SER/PLAS BY DETECTION LIMIT <= 0.05 MIU/L: 2.55 MIU/L (ref 0.44–3.98)
UREA NITROGEN (MG/DL) IN SER/PLAS: 4 MG/DL (ref 6–23)

## 2023-09-23 LAB
ANION GAP IN SER/PLAS: 12 MMOL/L (ref 10–20)
CALCIUM (MG/DL) IN SER/PLAS: 8.6 MG/DL (ref 8.6–10.3)
CARBON DIOXIDE, TOTAL (MMOL/L) IN SER/PLAS: 27 MMOL/L (ref 21–32)
CHLORIDE (MMOL/L) IN SER/PLAS: 104 MMOL/L (ref 98–107)
CREATININE (MG/DL) IN SER/PLAS: 0.53 MG/DL (ref 0.5–1.05)
ERYTHROCYTE DISTRIBUTION WIDTH (RATIO) BY AUTOMATED COUNT: 13.2 % (ref 11.5–14.5)
ERYTHROCYTE MEAN CORPUSCULAR HEMOGLOBIN CONCENTRATION (G/DL) BY AUTOMATED: 33.6 G/DL (ref 32–36)
ERYTHROCYTE MEAN CORPUSCULAR VOLUME (FL) BY AUTOMATED COUNT: 86 FL (ref 80–100)
ERYTHROCYTES (10*6/UL) IN BLOOD BY AUTOMATED COUNT: 4.57 X10E12/L (ref 4–5.2)
GFR FEMALE: >90 ML/MIN/1.73M2
GLUCOSE (MG/DL) IN SER/PLAS: 103 MG/DL (ref 74–99)
HEMATOCRIT (%) IN BLOOD BY AUTOMATED COUNT: 39.3 % (ref 36–46)
HEMOGLOBIN (G/DL) IN BLOOD: 13.2 G/DL (ref 12–16)
LEUKOCYTES (10*3/UL) IN BLOOD BY AUTOMATED COUNT: 6 X10E9/L (ref 4.4–11.3)
PLATELETS (10*3/UL) IN BLOOD AUTOMATED COUNT: 283 X10E9/L (ref 150–450)
POTASSIUM (MMOL/L) IN SER/PLAS: 3.5 MMOL/L (ref 3.5–5.3)
SODIUM (MMOL/L) IN SER/PLAS: 139 MMOL/L (ref 136–145)
UREA NITROGEN (MG/DL) IN SER/PLAS: 5 MG/DL (ref 6–23)

## 2023-09-24 LAB
ANION GAP IN SER/PLAS: 13 MMOL/L (ref 10–20)
CALCIUM (MG/DL) IN SER/PLAS: 8.7 MG/DL (ref 8.6–10.3)
CARBON DIOXIDE, TOTAL (MMOL/L) IN SER/PLAS: 29 MMOL/L (ref 21–32)
CHLORIDE (MMOL/L) IN SER/PLAS: 102 MMOL/L (ref 98–107)
CREATININE (MG/DL) IN SER/PLAS: 0.55 MG/DL (ref 0.5–1.05)
ERYTHROCYTE DISTRIBUTION WIDTH (RATIO) BY AUTOMATED COUNT: 13.5 % (ref 11.5–14.5)
ERYTHROCYTE MEAN CORPUSCULAR HEMOGLOBIN CONCENTRATION (G/DL) BY AUTOMATED: 32.8 G/DL (ref 32–36)
ERYTHROCYTE MEAN CORPUSCULAR VOLUME (FL) BY AUTOMATED COUNT: 88 FL (ref 80–100)
ERYTHROCYTES (10*6/UL) IN BLOOD BY AUTOMATED COUNT: 4.7 X10E12/L (ref 4–5.2)
GFR FEMALE: >90 ML/MIN/1.73M2
GLUCOSE (MG/DL) IN SER/PLAS: 115 MG/DL (ref 74–99)
HEMATOCRIT (%) IN BLOOD BY AUTOMATED COUNT: 41.5 % (ref 36–46)
HEMOGLOBIN (G/DL) IN BLOOD: 13.6 G/DL (ref 12–16)
LEUKOCYTES (10*3/UL) IN BLOOD BY AUTOMATED COUNT: 8.1 X10E9/L (ref 4.4–11.3)
PLATELETS (10*3/UL) IN BLOOD AUTOMATED COUNT: 306 X10E9/L (ref 150–450)
POCT GLUCOSE: 126 MG/DL (ref 74–99)
POTASSIUM (MMOL/L) IN SER/PLAS: 3.9 MMOL/L (ref 3.5–5.3)
SODIUM (MMOL/L) IN SER/PLAS: 140 MMOL/L (ref 136–145)
UREA NITROGEN (MG/DL) IN SER/PLAS: 13 MG/DL (ref 6–23)

## 2023-09-25 LAB
ANION GAP IN SER/PLAS: 13 MMOL/L (ref 10–20)
CALCIUM (MG/DL) IN SER/PLAS: 8.6 MG/DL (ref 8.6–10.3)
CARBON DIOXIDE, TOTAL (MMOL/L) IN SER/PLAS: 28 MMOL/L (ref 21–32)
CHLORIDE (MMOL/L) IN SER/PLAS: 104 MMOL/L (ref 98–107)
CREATININE (MG/DL) IN SER/PLAS: 0.55 MG/DL (ref 0.5–1.05)
ERYTHROCYTE DISTRIBUTION WIDTH (RATIO) BY AUTOMATED COUNT: 13.9 % (ref 11.5–14.5)
ERYTHROCYTE MEAN CORPUSCULAR HEMOGLOBIN CONCENTRATION (G/DL) BY AUTOMATED: 33 G/DL (ref 32–36)
ERYTHROCYTE MEAN CORPUSCULAR VOLUME (FL) BY AUTOMATED COUNT: 89 FL (ref 80–100)
ERYTHROCYTES (10*6/UL) IN BLOOD BY AUTOMATED COUNT: 4.54 X10E12/L (ref 4–5.2)
GFR FEMALE: >90 ML/MIN/1.73M2
GLUCOSE (MG/DL) IN SER/PLAS: 95 MG/DL (ref 74–99)
HEMATOCRIT (%) IN BLOOD BY AUTOMATED COUNT: 40.3 % (ref 36–46)
HEMOGLOBIN (G/DL) IN BLOOD: 13.3 G/DL (ref 12–16)
LEUKOCYTES (10*3/UL) IN BLOOD BY AUTOMATED COUNT: 6.9 X10E9/L (ref 4.4–11.3)
PLATELETS (10*3/UL) IN BLOOD AUTOMATED COUNT: 300 X10E9/L (ref 150–450)
POTASSIUM (MMOL/L) IN SER/PLAS: 3.6 MMOL/L (ref 3.5–5.3)
SODIUM (MMOL/L) IN SER/PLAS: 141 MMOL/L (ref 136–145)
UREA NITROGEN (MG/DL) IN SER/PLAS: 14 MG/DL (ref 6–23)

## 2023-09-26 LAB
ANION GAP IN SER/PLAS: 14 MMOL/L (ref 10–20)
CALCIUM (MG/DL) IN SER/PLAS: 8.1 MG/DL (ref 8.6–10.3)
CARBON DIOXIDE, TOTAL (MMOL/L) IN SER/PLAS: 26 MMOL/L (ref 21–32)
CHLORIDE (MMOL/L) IN SER/PLAS: 104 MMOL/L (ref 98–107)
CREATININE (MG/DL) IN SER/PLAS: 0.5 MG/DL (ref 0.5–1.05)
ERYTHROCYTE DISTRIBUTION WIDTH (RATIO) BY AUTOMATED COUNT: 13.6 % (ref 11.5–14.5)
ERYTHROCYTE MEAN CORPUSCULAR HEMOGLOBIN CONCENTRATION (G/DL) BY AUTOMATED: 32.8 G/DL (ref 32–36)
ERYTHROCYTE MEAN CORPUSCULAR VOLUME (FL) BY AUTOMATED COUNT: 89 FL (ref 80–100)
ERYTHROCYTES (10*6/UL) IN BLOOD BY AUTOMATED COUNT: 3.98 X10E12/L (ref 4–5.2)
GFR FEMALE: >90 ML/MIN/1.73M2
GLUCOSE (MG/DL) IN SER/PLAS: 92 MG/DL (ref 74–99)
HEMATOCRIT (%) IN BLOOD BY AUTOMATED COUNT: 35.4 % (ref 36–46)
HEMOGLOBIN (G/DL) IN BLOOD: 11.6 G/DL (ref 12–16)
LEUKOCYTES (10*3/UL) IN BLOOD BY AUTOMATED COUNT: 7.4 X10E9/L (ref 4.4–11.3)
PLATELETS (10*3/UL) IN BLOOD AUTOMATED COUNT: 306 X10E9/L (ref 150–450)
POTASSIUM (MMOL/L) IN SER/PLAS: 3.6 MMOL/L (ref 3.5–5.3)
SODIUM (MMOL/L) IN SER/PLAS: 140 MMOL/L (ref 136–145)
UREA NITROGEN (MG/DL) IN SER/PLAS: 9 MG/DL (ref 6–23)

## 2023-09-27 LAB
ANION GAP IN SER/PLAS: 14 MMOL/L (ref 10–20)
CALCIUM (MG/DL) IN SER/PLAS: 8.9 MG/DL (ref 8.6–10.3)
CARBON DIOXIDE, TOTAL (MMOL/L) IN SER/PLAS: 29 MMOL/L (ref 21–32)
CHLORIDE (MMOL/L) IN SER/PLAS: 102 MMOL/L (ref 98–107)
CREATININE (MG/DL) IN SER/PLAS: 0.48 MG/DL (ref 0.5–1.05)
ERYTHROCYTE DISTRIBUTION WIDTH (RATIO) BY AUTOMATED COUNT: 13.5 % (ref 11.5–14.5)
ERYTHROCYTE MEAN CORPUSCULAR HEMOGLOBIN CONCENTRATION (G/DL) BY AUTOMATED: 32.4 G/DL (ref 32–36)
ERYTHROCYTE MEAN CORPUSCULAR VOLUME (FL) BY AUTOMATED COUNT: 90 FL (ref 80–100)
ERYTHROCYTES (10*6/UL) IN BLOOD BY AUTOMATED COUNT: 4.61 X10E12/L (ref 4–5.2)
GFR FEMALE: >90 ML/MIN/1.73M2
GLUCOSE (MG/DL) IN SER/PLAS: 98 MG/DL (ref 74–99)
HEMATOCRIT (%) IN BLOOD BY AUTOMATED COUNT: 41.4 % (ref 36–46)
HEMOGLOBIN (G/DL) IN BLOOD: 13.4 G/DL (ref 12–16)
LEUKOCYTES (10*3/UL) IN BLOOD BY AUTOMATED COUNT: 8.3 X10E9/L (ref 4.4–11.3)
PLATELETS (10*3/UL) IN BLOOD AUTOMATED COUNT: 323 X10E9/L (ref 150–450)
POTASSIUM (MMOL/L) IN SER/PLAS: 3.5 MMOL/L (ref 3.5–5.3)
SODIUM (MMOL/L) IN SER/PLAS: 141 MMOL/L (ref 136–145)
UREA NITROGEN (MG/DL) IN SER/PLAS: 5 MG/DL (ref 6–23)

## 2023-09-28 LAB
ANION GAP IN SER/PLAS: NORMAL
CALCIUM (MG/DL) IN SER/PLAS: NORMAL
CARBON DIOXIDE, TOTAL (MMOL/L) IN SER/PLAS: NORMAL
CHLORIDE (MMOL/L) IN SER/PLAS: NORMAL
CREATININE (MG/DL) IN SER/PLAS: NORMAL
ERYTHROCYTE DISTRIBUTION WIDTH (RATIO) BY AUTOMATED COUNT: NORMAL
ERYTHROCYTE MEAN CORPUSCULAR HEMOGLOBIN CONCENTRATION (G/DL) BY AUTOMATED: NORMAL
ERYTHROCYTE MEAN CORPUSCULAR VOLUME (FL) BY AUTOMATED COUNT: NORMAL
ERYTHROCYTES (10*6/UL) IN BLOOD BY AUTOMATED COUNT: NORMAL
GFR FEMALE: NORMAL
GFR MALE: NORMAL
GLUCOSE (MG/DL) IN SER/PLAS: NORMAL
HEMATOCRIT (%) IN BLOOD BY AUTOMATED COUNT: NORMAL
HEMOGLOBIN (G/DL) IN BLOOD: NORMAL
LEUKOCYTES (10*3/UL) IN BLOOD BY AUTOMATED COUNT: NORMAL
NRBC (PER 100 WBCS) BY AUTOMATED COUNT: NORMAL
PLATELETS (10*3/UL) IN BLOOD AUTOMATED COUNT: NORMAL
POTASSIUM (MMOL/L) IN SER/PLAS: NORMAL
SODIUM (MMOL/L) IN SER/PLAS: NORMAL
UREA NITROGEN (MG/DL) IN SER/PLAS: NORMAL

## 2023-09-30 NOTE — DISCHARGE SUMMARY
Send Summary:   Discharge Summary Providers:  Provider Role Provider Name   · Attending Dalila Fernandez   · Primary Andrew Cagle       Note Recipients: Andrew Cagle MD - [phone] [preferred]       Discharge:    Summary:   Admission Date: .18-Sep-2023 01:10:00   Discharge Date: 27-Sep-2023   Attending Physician at Discharge: Dalila Fernandez   Admission Reason: UTI (1)   Final Discharge Diagnoses: UTI (urinary tract infection)   Nutrition Diagnosis:      Agree with dietitian?s assessment and diagnoses as stated.  A new diagnosis of Severe malnutrition related to chronic disease or condition related to  chronic illness  as evidence by moderate subcutaneous fat loss and moderate muscle wasting present.  Procedures: none   Condition at Discharge: Fair   Disposition at Discharge: Skilled Nursing Facility  (SNF)   Vital Signs:        T   P  R  BP   MAP  SpO2   Value  36.7  86  17  160/79   119  99%  Date/Time 9/27 12:25 9/27 12:25 9/27 12:25 9/27 12:25 9/27 4:54 9/27 12:25  Range  (36.2C - 37.1C )  (71 - 95 )  (16 - 18 )  (120 - 183 )/ (69 - 109 )  (100 - 119 )  (94% - 100% )  Highest temp of 37.1 C was recorded at 9/26 7:45    Date:            Weight/Scale Type:  Height:   18-Sep-2023 01:31  46.2  kg / bed  157.4  cm  Physical Exam:    Constitutional: awake, alert, no distress  Skin: warm and dry, no rashes  Eyes: nonicteric  Head/Neck: supple, no thyromegaly, JVP normal, Carotid pulse palpable and without bruit, no lymphadenopathy  Resp: clear without rales or wheezing  CV: Regular rhythm, normal S1 and S2 without gallop, rub or murmur  GI: abdomen is soft, nontender, no organomegaly  Ext: no edema, radial and pedal pulses 2+  Neuro: Alert x1    Hospital Course:    Pleasant lady with a past medical history significant for hypertension and Alzheimer's disease dementia who was admitted from an outside emergency room after the  family noticed that she was getting a little more lethargic and confused than  before  Emergency room work-up showed a UTI and she was admitted with acute encephalopathy secondary to UTI  We started the patient on IV antibiotics however the patient's condition did not improve a whole lot she continued to be encephalopathic with poor appetite  CT head was negative for any strokes  MRI of the head was done and that 2 came back without any acute pathology  An EEG was done which confirmed slow brain waves consistent with encephalopathy  The patient is severely malnourished we started the patient on mirtazapine in effort to stimulate her appetite  But the family has decided to not pursue with mirtazapine  The family was advised that the patient's dementia is likely getting worse and this might be a new baseline  She is quite deconditioned and will need physical therapy which we will arrange for and discharge patient to skilled nursing facility for therapy  She will need wound care nutrition assessment and care and fall precautions while at the skilled nursing facility  Patient seen at bedside. Events from the last visit reviewed. Discussed with staff. Results of tests and investigations from last visit reviewed and discussed with patient/Family. Electronic chart on [facility] reviewed. Input / Recommendations  from consultants  appreciated and reviewed and agreed with.    discharge summary and profile completed. medications reviewed and discussed with patient and family.  scripts completed and signed.    total discharge time in excess of 30 minutes.        Discharge Information:    and Continuing Care:   Lab Results - Pending:    None  Radiology Results - Pending: None   Discharge Instructions:    Activity:           activity as tolerated.          May shower..      Nutrition/Diet:           regular          Diet Consistency/Texture:   pureed,  regular / thin          Additive/Supplement:   Supplements:  Ensure High Protein by mouth 3 times a day       Rehab Services:           Occupational Therapy  "Orders:   Eval and Treat ([facility])   daily          Physical Therapy Orders:   Eval and Treat ([facility])   daily          Speech Therapy Orders:   Eval and Treat ([facility])   daily    Home Care Certification:           Home Care Agency:    Home Team [phone]          Skilled Disciplines Ordered:   RN/LPN,  PT,  OT    Home Care Services:           Home Care Skilled Service:   assessment,  Rehab (PT/OT/SP eval and treat)    Care Recommendation:           I recommend that INPATIENT care is required at::   Skilled          Estimated Stay:   Convalescent stay < 30 days    Follow Up Appointments:    Follow-Up Appointment 01:           Physician/Dept/Service:   Primary Care Provider          Call to Schedule in:   1 week    Discharge Medications: Home Medication   amLODIPine 5 mg oral tablet - 1 tab(s) orally once a day  rosuvastatin 10 mg oral tablet - 1 tab(s) orally once a day  donepezil 10 mg oral tablet - 1 tab(s) orally once a day (at bedtime)  potassium chloride 20 mEq oral granule, extended release - 1 dose(s) orally once a day  memantine 10 mg oral tablet - 1 tab(s) orally 2 times a day     PRN Medication   acetaminophen 325 mg oral tablet - 2 tab(s) orally every 4 hours, As needed, Pain - Mild (1-3)     DNR Status:   ·  Code Status Code Status order at time of discharge: Full Code       Electronic Signatures:  Dalila Fernandez)  (Signed 27-Sep-2023 16:41)   Authored: Send Summary, Summary Content, Ongoing Care,  DNR Status, Note Completion      Last Updated: 27-Sep-2023 16:41 by Dalila Fernandez)    References:  1.  Data Referenced From \"History and Physical\" 18-Sep-2023 09:45   "

## 2023-09-30 NOTE — PROGRESS NOTES
Service: Medicine     Subjective Data:   MIGUEL ÁNGEL RANKIN is a 73 year old Female who is Hospital Day # 9.     medications/notes/orders/tele reviewed    EEG shows encephalopathy.    Objective Data:     Objective Information:      T   P  R  BP   MAP  SpO2   Value  36.8  77  16  135/78      100%  Date/Time 9/26 11:20 9/26 11:20 9/26 11:20 9/26 11:20    9/26 11:20  Range  (36.2C - 37.1C )  (64 - 105 )  (16 - 18 )  (110 - 158 )/ (67 - 92 )    (91% - 100% )  Highest temp of 37.1 C was recorded at 9/26 7:45      Pain reported at 9/25 8:13: 0 = None    Physical Exam by System:    Constitutional: lethargic   ENMT: Missing multiple teeth   Head/Neck: Supple   Respiratory/Thorax: Patent airways, CTAB, normal  breath sounds with good chest expansion, thorax symmetric   Cardiovascular: Regular, rate and rhythm, no murmurs,  2+ equal pulses of the extremities, normal S 1and S 2   Gastrointestinal: Nondistended, soft, non-tender,  no rebound tenderness or guarding, no masses palpable, no organomegaly, +BS, no bruits   Musculoskeletal: ROM intact, no joint swelling, normal  strength   Extremities: normal extremities, no cyanosis edema,  contusions or wounds, no clubbing   Neurological: Alert x1   Breast: not examined   Lymphatic: No significant lymphadenopathy   Skin: Warm and dry, no lesions, no rashes     Medication:    Medications:          Continuous Medications       --------------------------------  No continuous medications are active       Scheduled Medications       --------------------------------    1. Docusate Oral Liquid:  100  mg  Oral  2 Times a Day    2. Donepezil:  10  mg  Oral  At Bedtime    3. Heparin SubCutaneous:  5000  unit(s)  SubCutaneous  Every 8 Hours    4. Memantine:  10  mg  Oral  2 Times a Day    5. Mirtazapine:  7.5  mg  Oral  At Bedtime    6. Pantoprazole:  40  mg  Oral  Daily    7. Rosuvastatin:  10  mg  Oral  Daily         PRN Medications       --------------------------------    1.  Acetaminophen:  650  mg  Oral  Every 4 Hours    2. Acetaminophen:  650  mg  Oral  Every 4 Hours    3. Dextromethorphan - guaiFENesin Oral Liquid:  5  mL  Oral  Every 4 Hours    4. Magnesium Hydroxide -Al Hydrox -Simethicone Oral Liquid:  30  mL  Oral  Every 6 Hours    5. Melatonin:  1.5  mg  Oral  At Bedtime    6. Ondansetron Injectable:  4  mg  IntraVenous Push  Every 4 Hours           Currently Suspended Medications       --------------------------------    1. amLODIPine (NORVASC):  5  mg  Oral  Daily      Recent Lab Results:    Results:    CBC: 9/26/2023 05:49              \     Hgb     /                              \     11.6 L    /  WBC  ----------------  Plt               7.4       ----------------    306              /     Hct     \                              /     35.4 L    \            RBC: 3.98 L    MCV: 89           BMP: 9/26/2023 05:49  NA+        Cl-     BUN  /                         140    104    9  /  --------------------------------  Glucose                ---------------------------  92    K+     HCO3-   Creat \                         3.6  26    0.50  \  Calcium : 8.1 L    Anion Gap : 14      Assessment and Plan:   Comorbidities:  ·  Comorbidity Other     Code Status:  ·  Code Status Full Code     Assessment:    Acute encephalopathy secondary to UTI  improving  EEG shows slowing of brain waves consistent with encephalopathy    Dementia  Worsened  This is likely her new baseline    Hypertension  stable  continue current medications  will monitor and change dosage/medications during stay as needed    Deconditioning  SNF placement    Severe malnutrition  Continue mirtazapine    Nutrition Diagnosis:  ·  Nutrition Diagnosis      Agree with dietitian?s assessment and diagnoses as stated.  A new diagnosis of Severe malnutrition related to chronic disease or condition related to  chronic illness  as evidence by moderate subcutaneous fat loss and moderate muscle wasting present.      Electronic  Signatures:  Dalila Fernandez)  (Signed 26-Sep-2023 13:51)   Authored: Service, Subjective Data, Objective Data, Assessment  and Plan, Note Completion      Last Updated: 26-Sep-2023 13:51 by Dalila Fernandez)

## 2023-09-30 NOTE — PROGRESS NOTES
Consult Type: subsequent visit/care     Service: Medicine     Subjective Data:   MIGUEL ÁNGEL RANKIN is a 73 year old Female who is Hospital Day # 3.     alert, oriented x0, mostly nonverbal.    Objective Data:     Objective Information:      T   P  R  BP   MAP  SpO2   Value  37.1  71  16  162/88      100%  Date/Time 9/20 11:39 9/20 11:39 9/20 11:39 9/20 11:39    9/20 11:39  Range  (36.1C - 37.2C )  (70 - 100 )  (16 - 18 )  (141 - 171 )/ (70 - 110 )    (95% - 100% )  Highest temp of 37.2 C was recorded at 9/19 12:27    Physical Exam Narrative:  ·  Physical Exam:      Constitutional: pt in NAD, alert and cooperative  Eyes: PERRL, no icterus   ENMT: mucous membranes moist, no apparent injury, no lesions seen  Head/Neck: Neck supple, no apparent injury  Respiratory/Thorax: Lungs CTA bilaterally, non-labored breathing, no cough, on RA  Cardiovascular: Regular, rate and rhythm, no murmurs, normal S1 and S2  Gastrointestinal: Nondistended, soft, non-tender, BS present x 4  : purewick cath, no urine output  Musculoskeletal: ROM intact, no joint swelling  Extremities: normal extremities, no edema, contusions or wounds  Neurological: alert and oriented x0  Skin: Warm and dry, no lesions, no rashes     Medication:    Medications:          Continuous Medications       --------------------------------    1. Sodium Chloride 0.9% Infusion:  1000  mL  IntraVenous  <Continuous>         Scheduled Medications       --------------------------------    1. amLODIPine (NORVASC):  5  mg  Oral  Daily    2. cefTRIAXone 1 gram/ Dextrose 5% IVPB Premixed Soln 50 mL:  50  mL  IntraVenous Piggyback  Every 24 Hours    3. Docusate Oral Liquid:  100  mg  Oral  2 Times a Day    4. Donepezil:  10  mg  Oral  At Bedtime    5. Heparin SubCutaneous:  5000  unit(s)  SubCutaneous  Every 8 Hours    6. Memantine:  10  mg  Oral  2 Times a Day    7. Pantoprazole:  40  mg  Oral  Daily    8. Rosuvastatin:  10  mg  Oral  Daily         PRN Medications        --------------------------------    1. Acetaminophen:  650  mg  Oral  Every 4 Hours    2. Acetaminophen:  650  mg  Oral  Every 4 Hours    3. Dextromethorphan - guaiFENesin Oral Liquid:  5  mL  Oral  Every 4 Hours    4. Magnesium Hydroxide -Al Hydrox -Simethicone Oral Liquid:  30  mL  Oral  Every 6 Hours    5. Melatonin:  1.5  mg  Oral  At Bedtime    6. Ondansetron Injectable:  4  mg  IntraVenous Push  Every 4 Hours        Recent Lab Results:    Results:        I have reviewed these laboratory results:    Basic Metabolic Panel  20-Sep-2023 05:50:00      Result Value    Glucose, Serum  95    NA  138    K  3.2   L   CL  103    Bicarbonate, Serum  25    Anion Gap, Serum  13    BUN  5   L   CREAT  0.60    GFR Female  >90    Calcium, Serum  8.4   L     Complete Blood Count  20-Sep-2023 05:50:00      Result Value    White Blood Cell Count  8.0    Red Blood Cell Count  4.74    HGB  13.7    HCT  40.7    MCV  86    MCHC  33.7    PLT  271    RDW-CV  13.0        Assessment and Plan:   Code Status:  ·  Code Status Full Code     Assessment:    73 year old female with hx of dementia, HTN admitted from home with acute encephalopathy 2/2 UTI.    Neuro: Acute encephalopathy 2/2 UTI.  Hx of dementia  - baseline mental status?  - today, alert and oriented x0, nonsensical speech  - continue atb for UTI  - continue home Donepezil, Memantine    Pulm: no acute issues    Cardiac: Hx of HTN  - SBP up to 170  - continue home Amlodipine 5 mg daily.  Consider increasing to 10 mg daily     FEN/GI: Dysphagia  - seen by SLP today  - continue Pureed 4/Thin liquid diet  - SLP to follow  - daily PPI    Renal: no acute issues    ID: UTI  - UA at [facility] urgent care.  No cx sent  - continue IV antibiotics  - repeat UA today    DVT prophylaxis: Heparin SQ    Dispo: poss d/c home with spouse tomorrow.              Electronic Signatures:  Daisha Acuña (APRN-CNP)  (Signed 20-Sep-2023 15:59)   Authored: Service, Subjective Data, Objective Data, Assessment   and Plan, Note Completion      Last Updated: 20-Sep-2023 15:59 by Daisha Acuña (APRN-CNP)

## 2023-09-30 NOTE — PROGRESS NOTES
Service: Medicine     Subjective Data:   MIGUEL ÁNGEL RANKIN is a 73 year old Female who is Hospital Day # 8.     medications/notes/orders/tele reviewed    a bit more awake  Discussed care with spouse he is concerned about the weakness the patient is experiencing  Once again recommended skilled nursing facility  Spouse is now agreeable to skilled nursing facility  And will let  now.    Objective Data:     Objective Information:      T   P  R  BP   MAP  SpO2   Value  36.6  101  16  141/89      94%  Date/Time 9/25 15:07 9/25 15:07 9/25 15:07 9/25 15:07    9/25 15:07  Range  (36.2C - 36.8C )  (64 - 144 )  (16 - 18 )  (93 - 141 )/ (54 - 99 )    (91% - 100% )      Pain reported at 9/25 1:20: 0 = None    Physical Exam by System:    Constitutional: lethargic   ENMT: Missing multiple teeth   Head/Neck: Supple   Respiratory/Thorax: Patent airways, CTAB, normal  breath sounds with good chest expansion, thorax symmetric   Cardiovascular: Regular, rate and rhythm, no murmurs,  2+ equal pulses of the extremities, normal S 1and S 2   Gastrointestinal: Nondistended, soft, non-tender,  no rebound tenderness or guarding, no masses palpable, no organomegaly, +BS, no bruits   Musculoskeletal: ROM intact, no joint swelling, normal  strength   Extremities: normal extremities, no cyanosis edema,  contusions or wounds, no clubbing   Neurological: Alert x1   Breast: not examined   Lymphatic: No significant lymphadenopathy   Skin: Warm and dry, no lesions, no rashes     Recent Lab Results:    Results:    CBC: 9/25/2023 06:33              \     Hgb     /                              \     13.3       /  WBC  ----------------  Plt               6.9       ----------------    300              /     Hct     \                              /     40.3       \            RBC: 4.54     MCV: 89           BMP: 9/25/2023 06:33  NA+        Cl-     BUN  /                         141    104    14  /  --------------------------------   Glucose                ---------------------------  95    K+     HCO3-   Creat \                         3.6  28    0.55  \  Calcium : 8.6     Anion Gap : 13      Assessment and Plan:   Code Status:  ·  Code Status Full Code     Assessment:    Acute encephalopathy secondary to UTI  Continue IV Rocephin  Repeat urinalysis is OK  Continued lethargy with poor oral intake  CT head negative  MRI negative  Off Rocephin  EEG pending    Dementia  Worsening  This could be her new baseline  Advised patient's spouse of the same    Hypertension  stable  continue current medications  will monitor and change dosage/medications during stay as needed    Deconditioning  Spouse is now agreeable to consider skilled nursing facility  Will inform  of the same    Severe malnutrition  Continue mirtazapine    Nutrition Diagnosis:  ·  Nutrition Diagnosis      Agree with dietitian?s assessment and diagnoses as stated.  A new diagnosis of Severe malnutrition related to chronic disease or condition related to  chronic illness  as evidence by moderate subcutaneous fat loss and moderate muscle wasting present.      Electronic Signatures:  Dalila Fernandez)  (Signed 25-Sep-2023 15:17)   Authored: Service, Subjective Data, Objective Data, Assessment  and Plan, Note Completion      Last Updated: 25-Sep-2023 15:17 by Dalila Fernandez)

## 2023-09-30 NOTE — PROGRESS NOTES
Service: Medicine     Subjective Data:   MIGUEL ÁNGEL RANKIN is a 73 year old Female who is Hospital Day # 7.     medications/notes/orders/tele reviewed    unchanged.    Objective Data:     Objective Information:      T   P  R  BP   MAP  SpO2   Value  36.8  98  18  116/75      97%  Date/Time 9/24 7:44 9/24 7:44 9/24 7:44 9/24 7:44    9/24 7:44  Range  (36.2C - 36.8C )  (74 - 144 )  (18 - 19 )  (116 - 175 )/ (72 - 99 )    (93% - 100% )      Pain reported at 9/23 21:15: 0 = None    Physical Exam by System:    Constitutional: lethargic   ENMT: Missing multiple teeth   Head/Neck: Supple   Respiratory/Thorax: Patent airways, CTAB, normal  breath sounds with good chest expansion, thorax symmetric   Cardiovascular: Regular, rate and rhythm, no murmurs,  2+ equal pulses of the extremities, normal S 1and S 2   Gastrointestinal: Nondistended, soft, non-tender,  no rebound tenderness or guarding, no masses palpable, no organomegaly, +BS, no bruits   Musculoskeletal: ROM intact, no joint swelling, normal  strength   Extremities: normal extremities, no cyanosis edema,  contusions or wounds, no clubbing   Neurological: Alert x1   Breast: not examined   Lymphatic: No significant lymphadenopathy   Skin: Warm and dry, no lesions, no rashes     Medication:    Medications:          Continuous Medications       --------------------------------  No continuous medications are active       Scheduled Medications       --------------------------------    1. amLODIPine (NORVASC):  5  mg  Oral  Daily    2. Cefadroxil:  500  mg  Oral  Every 12 Hours    3. Docusate Oral Liquid:  100  mg  Oral  2 Times a Day    4. Donepezil:  10  mg  Oral  At Bedtime    5. Heparin SubCutaneous:  5000  unit(s)  SubCutaneous  Every 8 Hours    6. Memantine:  10  mg  Oral  2 Times a Day    7. Mirtazapine:  7.5  mg  Oral  At Bedtime    8. Pantoprazole:  40  mg  Oral  Daily    9. Rosuvastatin:  10  mg  Oral  Daily         PRN Medications        --------------------------------    1. Acetaminophen:  650  mg  Oral  Every 4 Hours    2. Acetaminophen:  650  mg  Oral  Every 4 Hours    3. Dextromethorphan - guaiFENesin Oral Liquid:  5  mL  Oral  Every 4 Hours    4. Magnesium Hydroxide -Al Hydrox -Simethicone Oral Liquid:  30  mL  Oral  Every 6 Hours    5. Melatonin:  1.5  mg  Oral  At Bedtime    6. Ondansetron Injectable:  4  mg  IntraVenous Push  Every 4 Hours        Recent Lab Results:    Results:    CBC: 9/24/2023 07:20              \     Hgb     /                              \     13.6       /  WBC  ----------------  Plt               8.1       ----------------    306              /     Hct     \                              /     41.5       \            RBC: 4.70     MCV: 88           BMP: 9/24/2023 07:20  NA+        Cl-     BUN  /                         140    102    13  /  --------------------------------  Glucose                ---------------------------  115 H    K+     HCO3-   Creat \                         3.9  29    0.55  \  Calcium : 8.7     Anion Gap : 13      Assessment and Plan:   Code Status:  ·  Code Status Full Code     Assessment:    Acute encephalopathy secondary to UTI  Continue IV Rocephin  Repeat urinalysis is OK  Continued lethargy with poor oral intake  CT head negative  MRI negative  Off Rocephin  EEG pending    Dementia  Worsening  This could be her new baseline  Advised patient's spouse of the same    Hypertension  stable  continue current medications  will monitor and change dosage/medications during stay as needed    PT OT consulted  Patient's spouse wants to take her home with home health care    Severe malnutrition  Nutrition consult  Continue mirtazapine    Nutrition Diagnosis:  ·  Nutrition Diagnosis      Agree with dietitian?s assessment and diagnoses as stated.  A new diagnosis of Severe malnutrition related to chronic disease or condition related to  chronic illness  as evidence by moderate subcutaneous fat loss  and moderate muscle wasting present.      Electronic Signatures:  Dalila Fernandez)  (Signed 24-Sep-2023 12:27)   Authored: Service, Subjective Data, Objective Data, Assessment  and Plan, Note Completion      Last Updated: 24-Sep-2023 12:27 by Dalila Fernandez)

## 2023-09-30 NOTE — H&P
History of Present Illness:   Admission Reason: UTI   HPI:    MIGUEL ÁNGEL RANKIN is a 73 year old Female  who came to ed for eval for fever and lethargy from home with spouse. She unabe to offer histry. HPI per paulina and .  He cares for her at home tells me tht pt able to nod head yes and no for communication. not eating or drinking much. more sleepy than usual. she has history of htn and dementia. brought to ER noted for UTI,  admitted for further evaluation.      PMHx / Surg Hx   -HTN  -Dementia     Socail Hx  -from home with   no recent etoh tobacco no drug use       social hx  -obtained not relevant     allergies and meds see med rec        HPI as above  unable to get further ROS from pt      Physical Exam:  Appearance: frail elderly, chr ill appearing Alert, oriented , cooperative,  poor dention. monica to folow some commands   Skin: Intact,  dry skin, no lesions, rash, petechiae or purpura.   Eyes: PERRLA, EOMs intact,  Conjunctiva pink with no redness or exudates. Cornea & anterior chamber are clear, Eyelids without lesions. No scleral icterus.   ENT: Hearing grossly intact. Nares patent, mucus membranes moist. Dentition without lesions.   Pharynx clear, uvula midline.   Neck: Supple, without meningismus. Thyroid not palpable. Trachea at midline. No lymphadenopathy.  Pulmonary: Clear bilaterally with good chest wall excursion. No rales, rhonchi or wheezing. No accessory muscle use or stridor.  Cardiac: Normal S1, S2 without murmur, rub, gallop or extrasystole. No JVD, Carotids without bruits.  Abdomen: Soft, nontender, active bowel sounds.  No palpable organomegaly.  No rebound or guarding.  No CVA tenderness.  Genitourinary: Exam deferred.  Musculoskeletal: Full range of motion. no pain, edema, or deformity. Pulses full and equal. No cyanosis, clubbing, or edema.  Neurological:  Ao self      Comorbidities:   Comorbidites:  ·  Comorbid Conditions hypertension     Family History:   Family History:  unable to obtain      Social History:   Social History:  Smoking Status never smoker (1)   Alcohol Use denies(1)   Drug Use denies (1)   Drug 2 Use denies (1)              Allergies:  ·  No Known Allergies :     Medications Prior to Admission:   Admission Medication Reconciliation has not been completed for this patient.    Objective:     Objective Information:      T   P  R  BP   MAP  SpO2   Value  36.8  106  17  154/94      98%  Date/Time 9/18 11:35 9/18 11:35 9/18 11:35 9/18 11:35    9/18 11:35  Range  (36.6C - 36.8C )  (90 - 106 )  (17 - 18 )  (144 - 173 )/ (91 - 97 )    (97% - 100% )      Pain reported at 9/18 1:31: 3  Pain reported at 9/18 12:00: 0 = None    Medications:    Medications:          Continuous Medications       --------------------------------    1. Sodium Chloride 0.9% Infusion:  1000  mL  IntraVenous  <Continuous>         Scheduled Medications       --------------------------------    1. cefTRIAXone 1 gram/ Dextrose 5% IVPB Premixed Soln 50 mL:  50  mL  IntraVenous Piggyback  Every 24 Hours    2. Docusate:  100  mg  Oral  2 Times a Day    3. Heparin SubCutaneous:  5000  unit(s)  SubCutaneous  Every 8 Hours    4. Pantoprazole:  40  mg  Oral  Daily         PRN Medications       --------------------------------    1. Acetaminophen:  650  mg  Oral  Every 4 Hours    2. Acetaminophen:  650  mg  Oral  Every 4 Hours    3. Dextromethorphan - guaiFENesin Oral Liquid:  5  mL  Oral  Every 4 Hours    4. Magnesium Hydroxide -Al Hydrox -Simethicone Oral Liquid:  30  mL  Oral  Every 6 Hours    5. Melatonin:  1.5  mg  Oral  At Bedtime    6. Ondansetron Injectable:  4  mg  IntraVenous Push  Every 4 Hours        Recent Lab Results:    Results:    CBC: 9/18/2023 10:51              \     Hgb     /                              \     14.3       /  WBC  ----------------  Plt               8.8       ----------------    298              /     Hct     \                              /     43.1       \            RBC: 4.90      MCV: 88           BMP: 9/18/2023 10:51  NA+        Cl-     BUN  /                         136    103    6  /  --------------------------------  Glucose                ---------------------------  117 H    K+     HCO3-   Creat \                         3.5  24    0.57  \  Calcium : 8.2 L    Anion Gap : 13      Assessment and Plan:   Assessment:    73 yof here for     a/p     toxometabolic encephalopathy   dementnia  uti  htn      admit to medicine  resume home meds  abx  iv fluids      -Continue current treatment as ordered. Will make adjustments as necessary.     VTE Prophylaxis  -See orders    Plan of care discussed with: pt and nurse    Brenton Myers MSN, APRN-CNP  -In collaboration with Dr. LISSY Morin    [facility]  Office: [phone]  Cellular: [phone]  Fax: [phone]  [id number] [facility] [address] [place] [address]     Attestation:   Note Completion:  I am a:  Advanced Practice Provider   Attending Only - Shared Visit with Advanced Practice Provider This is a shared visit.  I have reviewed the Advanced Practice Provider?s encounter note, approve the Advanced Practice Provider?s documentation,  and provide the following additional information from my personal encounter.    Comments/ Additional Findings    pt seen and dw NP earlier today, agree with assessement and plan  she is not able to provide history  no chest pain  no shortness of rbeath   o/e no distress  frail elderly thin aaf  chest clear  [facility] regular  ext no edema  abdo soft bs actv,e no masses  lasb reveiwed  a/p pt dw ED, nursing , np   started on rocephn for UTI,   will cont with home meds  dw pharmacy  see orders for fluids,   monitor BP  Fernando Morni MD          Electronic Signatures:  Brenton Myers (APRN-CNP)  (Signed 18-Sep-2023 14:02)   Authored: History of Present Illness, Comorbidities,  Social History, Allergies, Medications Prior to Admission, Objective, Assessment and  "Plan  Fernando Morin)  (Signed 19-Sep-2023 04:48)   Authored: History of Present Illness, Comorbidities,  Family History, Assessment and Plan, Note Completion   Co-Signer: History of Present Illness, Comorbidities, Social History, Allergies, Medications Prior to Admission, Objective, Assessment  and Plan      Last Updated: 19-Sep-2023 04:48 by Fernando Morin (MD)    References:  1.  Data Referenced From \"Patient Profile - Adult v2\" 18-Sep-2023 01:31   "

## 2023-09-30 NOTE — PROGRESS NOTES
Service: Medicine     Subjective Data:   MIGUEL ÁNGEL RANKIN is a 73 year old Female who is Hospital Day # 5.     medications/notes/orders/tele reviewed    Remains lethargic with poor appetite  I started mirtazapine last night so it is unlikely that this is causing her current symptoms  CT head negative  We will get an MRI  Discussed with patient's spouse.    Objective Data:     Objective Information:      T   P  R  BP   MAP  SpO2   Value  36  66  16  124/90      96%  Date/Time 9/22 12:12 9/22 12:12 9/22 12:12 9/22 12:12    9/22 12:12  Range  (36C - 37.3C )  (66 - 108 )  (16 - 18 )  (124 - 178 )/ (72 - 110 )    (96% - 100% )  Highest temp of 37.3 C was recorded at 9/22 0:00      Pain reported at 9/22 11:56: 0 = None    Physical Exam by System:    Constitutional: lethargic   ENMT: Missing multiple teeth   Head/Neck: Supple   Respiratory/Thorax: Patent airways, CTAB, normal  breath sounds with good chest expansion, thorax symmetric   Cardiovascular: Regular, rate and rhythm, no murmurs,  2+ equal pulses of the extremities, normal S 1and S 2   Gastrointestinal: Nondistended, soft, non-tender,  no rebound tenderness or guarding, no masses palpable, no organomegaly, +BS, no bruits   Musculoskeletal: ROM intact, no joint swelling, normal  strength   Extremities: normal extremities, no cyanosis edema,  contusions or wounds, no clubbing   Neurological: Alert x1   Breast: not examined   Lymphatic: No significant lymphadenopathy   Skin: Warm and dry, no lesions, no rashes     Medication:    Medications:          Continuous Medications       --------------------------------  No continuous medications are active       Scheduled Medications       --------------------------------    1. amLODIPine (NORVASC):  5  mg  Oral  Daily    2. cefTRIAXone 1 gram/ Dextrose 5% IVPB Premixed Soln 50 mL:  50  mL  IntraVenous Piggyback  Every 24 Hours    3. Docusate Oral Liquid:  100  mg  Oral  2 Times a Day    4. Donepezil:  10  mg  Oral  At  Bedtime    5. Heparin SubCutaneous:  5000  unit(s)  SubCutaneous  Every 8 Hours    6. Memantine:  10  mg  Oral  2 Times a Day    7. Mirtazapine:  7.5  mg  Oral  At Bedtime    8. Pantoprazole:  40  mg  Oral  Daily    9. Rosuvastatin:  10  mg  Oral  Daily         PRN Medications       --------------------------------    1. Acetaminophen:  650  mg  Oral  Every 4 Hours    2. Acetaminophen:  650  mg  Oral  Every 4 Hours    3. Dextromethorphan - guaiFENesin Oral Liquid:  5  mL  Oral  Every 4 Hours    4. Magnesium Hydroxide -Al Hydrox -Simethicone Oral Liquid:  30  mL  Oral  Every 6 Hours    5. Melatonin:  1.5  mg  Oral  At Bedtime    6. Ondansetron Injectable:  4  mg  IntraVenous Push  Every 4 Hours        Recent Lab Results:    Results:    CBC: 9/22/2023 05:47              \     Hgb     /                              \     14.7       /  WBC  ----------------  Plt               8.8       ----------------    304              /     Hct     \                              /     43.7       \            RBC: 5.08     MCV: 86           BMP: 9/22/2023 05:47  NA+        Cl-     BUN  /                         137    101    4 L /  --------------------------------  Glucose                ---------------------------  121 H    K+     HCO3-   Creat \                         3.0 L 27    0.53  \  Calcium : 8.8     Anion Gap : 12      Radiology Results:    Results:        Impression:    1.  No noncontrast CT evidence of acute cortical ischemia, hemorrhage  or mass. If there is persistent clinical concern, MRI with  diffusion-weighted imaging may be obtained for further evaluation as  it is a more sensitive test for this indication.      CT Head without Contrast [Sep 22 2023  8:41AM]      Assessment and Plan:   Code Status:  ·  Code Status Full Code     Assessment:    Acute encephalopathy secondary to UTI  Continue IV Rocephin  Repeat urinalysis is OK  Continued lethargy with poor oral intake  CT head negative  MRI brain to rule out  CVA versus TIA  Continue mirtazapine  Neurology input sought      Dementia  Worsening  This could be her new baseline  Advised patient's spouse of the same    Hypertension  stable  continue current medications  will monitor and change dosage/medications during stay as needed    PT OT consulted  Patient's spouse wants to take her home with home health care    Severe malnutrition  Nutrition consult  Continue mirtazapine    Nutrition Diagnosis:  ·  Nutrition Diagnosis      Agree with dietitian?s assessment and diagnoses as stated.  A new diagnosis of Severe malnutrition related to chronic disease or condition related to  chronic illness  as evidence by moderate subcutaneous fat loss and moderate muscle wasting present.      Electronic Signatures:  Dalila Fernandez)  (Signed 22-Sep-2023 15:38)   Authored: Service, Subjective Data, Objective Data, Assessment  and Plan, Note Completion      Last Updated: 22-Sep-2023 15:38 by Dalila Fernandez)

## 2023-09-30 NOTE — PROGRESS NOTES
Service: Medicine     Subjective Data:   MIGUEL ÁNGEL RANKIN is a 73 year old Female who is Hospital Day # 3.     medications/notes/orders/tele reviewed    more alert per spouse.    Objective Data:     Objective Information:      T   P  R  BP   MAP  SpO2   Value  37.1  71  16  162/88      100%  Date/Time 9/20 11:39 9/20 11:39 9/20 11:39 9/20 11:39    9/20 11:39  Range  (36.1C - 37.2C )  (70 - 100 )  (16 - 18 )  (141 - 171 )/ (70 - 110 )    (95% - 100% )  Highest temp of 37.2 C was recorded at 9/19 12:27    Physical Exam by System:    Constitutional: alert   ENMT: Missing multiple teeth   Head/Neck: Supple   Respiratory/Thorax: Patent airways, CTAB, normal  breath sounds with good chest expansion, thorax symmetric   Cardiovascular: Regular, rate and rhythm, no murmurs,  2+ equal pulses of the extremities, normal S 1and S 2   Gastrointestinal: Nondistended, soft, non-tender,  no rebound tenderness or guarding, no masses palpable, no organomegaly, +BS, no bruits   Musculoskeletal: ROM intact, no joint swelling, normal  strength   Extremities: normal extremities, no cyanosis edema,  contusions or wounds, no clubbing   Neurological: Alert x1   Breast: not examined   Lymphatic: No significant lymphadenopathy   Skin: Warm and dry, no lesions, no rashes     Medication:    Medications:          Continuous Medications       --------------------------------    1. Sodium Chloride 0.9% Infusion:  1000  mL  IntraVenous  <Continuous>         Scheduled Medications       --------------------------------    1. amLODIPine (NORVASC):  5  mg  Oral  Daily    2. cefTRIAXone 1 gram/ Dextrose 5% IVPB Premixed Soln 50 mL:  50  mL  IntraVenous Piggyback  Every 24 Hours    3. Docusate Oral Liquid:  100  mg  Oral  2 Times a Day    4. Donepezil:  10  mg  Oral  At Bedtime    5. Heparin SubCutaneous:  5000  unit(s)  SubCutaneous  Every 8 Hours    6. Memantine:  10  mg  Oral  2 Times a Day    7. Pantoprazole:  40  mg  Oral  Daily    8.  Rosuvastatin:  10  mg  Oral  Daily         PRN Medications       --------------------------------    1. Acetaminophen:  650  mg  Oral  Every 4 Hours    2. Acetaminophen:  650  mg  Oral  Every 4 Hours    3. Dextromethorphan - guaiFENesin Oral Liquid:  5  mL  Oral  Every 4 Hours    4. Magnesium Hydroxide -Al Hydrox -Simethicone Oral Liquid:  30  mL  Oral  Every 6 Hours    5. Melatonin:  1.5  mg  Oral  At Bedtime    6. Ondansetron Injectable:  4  mg  IntraVenous Push  Every 4 Hours        Recent Lab Results:    Results:    CBC: 9/20/2023 05:50              \     Hgb     /                              \     13.7       /  WBC  ----------------  Plt               8.0       ----------------    271              /     Hct     \                              /     40.7       \            RBC: 4.74     MCV: 86           BMP: 9/20/2023 05:50  NA+        Cl-     BUN  /                         138    103    5 L /  --------------------------------  Glucose                ---------------------------  95    K+     HCO3-   Creat \                         3.2 L 25    0.60  \  Calcium : 8.4 L    Anion Gap : 13      Assessment and Plan:   Code Status:  ·  Code Status Full Code     Assessment:    Acute encephalopathy secondary to UTI  No culture was sent from the emergency room  Continue IV Rocephin  Repeat urinalysis with cultures pending    Hypokalemia replenish      Dementia  According to the  the patient is quite functional on this community  Therefore this is a clear decline in her memory  Check a B12 folic acid pending    Hypertension  stable  continue current medications  will monitor and change dosage/medications during stay as needed    PT OT consulted  Patient's spouse wants to take her home with home health care      Electronic Signatures:  Dalila Fernandez)  (Signed 20-Sep-2023 14:13)   Authored: Service, Subjective Data, Objective Data, Assessment  and Plan, Note Completion      Last Updated: 20-Sep-2023  14:13 by Dalila Fernandez)

## 2023-09-30 NOTE — PROGRESS NOTES
Service: Medicine     Subjective Data:   MIGUEL ÁNGEL RANKIN is a 73 year old Female who is Hospital Day # 2.     medications/notes/orders/tele reviewed    Discussed with  in the room  Currently the patient is sleeping  According to the  she is a bit more alert later in the day and when he feeds her and he is she does a good job eating.    Objective Data:     Objective Information:      T   P  R  BP   MAP  SpO2   Value  37.2  94  18  152/98      95%  Date/Time 9/19 12:27 9/19 12:27 9/19 12:27 9/19 12:27 9/19 12:27  Range  (36.1C - 37.2C )  (83 - 106 )  (16 - 18 )  (144 - 173 )/ (88 - 99 )    (95% - 100% )  Highest temp of 37.2 C was recorded at 9/19 12:27      Pain reported at 9/18 9:00: 3  Pain reported at 9/18 21:19: 0 = None    Physical Exam by System:    Constitutional: Lethargic   ENMT: Missing multiple teeth   Head/Neck: Supple   Respiratory/Thorax: Patent airways, CTAB, normal  breath sounds with good chest expansion, thorax symmetric   Cardiovascular: Regular, rate and rhythm, no murmurs,  2+ equal pulses of the extremities, normal S 1and S 2   Gastrointestinal: Nondistended, soft, non-tender,  no rebound tenderness or guarding, no masses palpable, no organomegaly, +BS, no bruits   Musculoskeletal: ROM intact, no joint swelling, normal  strength   Extremities: normal extremities, no cyanosis edema,  contusions or wounds, no clubbing   Neurological: Alert x1   Breast: not examined   Lymphatic: No significant lymphadenopathy   Skin: Warm and dry, no lesions, no rashes     Medication:    Medications:          Continuous Medications       --------------------------------    1. Sodium Chloride 0.9% Infusion:  1000  mL  IntraVenous  <Continuous>         Scheduled Medications       --------------------------------    1. amLODIPine (NORVASC):  5  mg  Oral  Daily    2. cefTRIAXone 1 gram/ Dextrose 5% IVPB Premixed Soln 50 mL:  50  mL  IntraVenous Piggyback  Every 24 Hours    3. Docusate:  100  mg   Oral  2 Times a Day    4. Donepezil:  10  mg  Oral  At Bedtime    5. Heparin SubCutaneous:  5000  unit(s)  SubCutaneous  Every 8 Hours    6. Memantine:  10  mg  Oral  2 Times a Day    7. Pantoprazole:  40  mg  Oral  Daily    8. Rosuvastatin:  10  mg  Oral  Daily         PRN Medications       --------------------------------    1. Acetaminophen:  650  mg  Oral  Every 4 Hours    2. Acetaminophen:  650  mg  Oral  Every 4 Hours    3. Dextromethorphan - guaiFENesin Oral Liquid:  5  mL  Oral  Every 4 Hours    4. Magnesium Hydroxide -Al Hydrox -Simethicone Oral Liquid:  30  mL  Oral  Every 6 Hours    5. Melatonin:  1.5  mg  Oral  At Bedtime    6. Ondansetron Injectable:  4  mg  IntraVenous Push  Every 4 Hours        Recent Lab Results:    Results:    CBC: 9/19/2023 07:00              \     Hgb     /                              \     13.6       /  WBC  ----------------  Plt               6.6       ----------------    263              /     Hct     \                              /     40.8       \            RBC: 4.61     MCV: 89           BMP: 9/19/2023 07:00  NA+        Cl-     BUN  /                         137    104    4 L /  --------------------------------  Glucose                ---------------------------  96    K+     HCO3-   Creat \                         3.7  24    0.49 L \  Calcium : 8.0 L    Anion Gap : 13      Assessment and Plan:   Code Status:  ·  Code Status Full Code     Assessment:    Acute encephalopathy secondary to UTI  No culture was sent from the emergency room  Continue IV Rocephin  Repeat urinalysis with cultures    Dementia  According to the  the patient is quite functional on this community  Therefore this is a clear decline in her memory  Check a B12 folic acid    Hypertension  stable  continue current medications  will monitor and change dosage/medications during stay as needed        Electronic Signatures:  Dalila Fernandez)  (Signed 19-Sep-2023 16:14)   Authored: Service,  Subjective Data, Objective Data, Assessment  and Plan, Note Completion      Last Updated: 19-Sep-2023 16:14 by Dalila Fernandez)

## 2023-09-30 NOTE — PROGRESS NOTES
Service: Medicine     Subjective Data:   MIGUEL ÁNGEL RNAKIN is a 73 year old Female who is Hospital Day # 4.     medications/notes/orders/tele reviewed    lethargic  did not eat all day because no one assisted with meals.    Objective Data:     Objective Information:      T   P  R  BP   MAP  SpO2   Value  36.5  80  17  154/94      97%  Date/Time 9/21 14:56 9/21 14:56 9/21 14:56 9/21 14:56    9/21 14:56  Range  (36.1C - 37.1C )  (70 - 94 )  (16 - 18 )  (121 - 172 )/ (70 - 110 )    (93% - 100% )  Highest temp of 37.1 C was recorded at 9/20 11:39      Pain reported at 9/20 20:00: 0 = None    Physical Exam by System:    Constitutional: lethargic   ENMT: Missing multiple teeth   Head/Neck: Supple   Respiratory/Thorax: Patent airways, CTAB, normal  breath sounds with good chest expansion, thorax symmetric   Cardiovascular: Regular, rate and rhythm, no murmurs,  2+ equal pulses of the extremities, normal S 1and S 2   Gastrointestinal: Nondistended, soft, non-tender,  no rebound tenderness or guarding, no masses palpable, no organomegaly, +BS, no bruits   Musculoskeletal: ROM intact, no joint swelling, normal  strength   Extremities: normal extremities, no cyanosis edema,  contusions or wounds, no clubbing   Neurological: Alert x1   Breast: not examined   Lymphatic: No significant lymphadenopathy   Skin: Warm and dry, no lesions, no rashes     Medication:    Medications:          Continuous Medications       --------------------------------  No continuous medications are active       Scheduled Medications       --------------------------------    1. amLODIPine (NORVASC):  5  mg  Oral  Daily    2. cefTRIAXone 1 gram/ Dextrose 5% IVPB Premixed Soln 50 mL:  50  mL  IntraVenous Piggyback  Every 24 Hours    3. Docusate Oral Liquid:  100  mg  Oral  2 Times a Day    4. Donepezil:  10  mg  Oral  At Bedtime    5. Heparin SubCutaneous:  5000  unit(s)  SubCutaneous  Every 8 Hours    6. Memantine:  10  mg  Oral  2 Times a Day    7.  Mirtazapine:  7.5  mg  Oral  At Bedtime    8. Pantoprazole:  40  mg  Oral  Daily    9. Rosuvastatin:  10  mg  Oral  Daily         PRN Medications       --------------------------------    1. Acetaminophen:  650  mg  Oral  Every 4 Hours    2. Acetaminophen:  650  mg  Oral  Every 4 Hours    3. Dextromethorphan - guaiFENesin Oral Liquid:  5  mL  Oral  Every 4 Hours    4. Magnesium Hydroxide -Al Hydrox -Simethicone Oral Liquid:  30  mL  Oral  Every 6 Hours    5. Melatonin:  1.5  mg  Oral  At Bedtime    6. Ondansetron Injectable:  4  mg  IntraVenous Push  Every 4 Hours        Recent Lab Results:    Results:        BMP: 9/21/2023 05:59  NA+        Cl-     BUN  /                         137    102    3 L /  --------------------------------  Glucose                ---------------------------  93    K+     HCO3-   Creat \                         4.0  22    0.41 L \  Calcium : 8.2 L    Anion Gap : 17      Assessment and Plan:   Code Status:  ·  Code Status Full Code     Assessment:    Acute encephalopathy secondary to UTI  No culture was sent from the emergency room  Continue IV Rocephin  Repeat urinalysis is OK    Lethargy with poor oral intake  CT head  start Mirtazapine      Dementia  According to the  the patient is quite functional on this community  Therefore this is a clear decline in her memory  Check a B12 folic acid pending    Hypertension  stable  continue current medications  will monitor and change dosage/medications during stay as needed    PT OT consulted  Patient's spouse wants to take her home with home health care      Electronic Signatures:  Dalila Fernandez)  (Signed 21-Sep-2023 19:44)   Authored: Service, Subjective Data, Objective Data, Assessment  and Plan, Note Completion      Last Updated: 21-Sep-2023 19:44 by Dalila Fernandez)

## 2023-09-30 NOTE — PROGRESS NOTES
Service: Medicine     Subjective Data:   MIGUEL ÁNGEL RANKIN is a 73 year old Female who is Hospital Day # 6.     medications/notes/orders/tele reviewed    He is confused discussed with neurology  And EEG will be done.    Objective Data:     Objective Information:      T   P  R  BP   MAP  SpO2   Value  36.3  79  19  121/79      97%  Date/Time 9/23 8:08 9/23 8:08 9/23 8:08 9/23 8:08    9/23 8:08  Range  (36C - 36.8C )  (66 - 108 )  (16 - 19 )  (113 - 175 )/ (79 - 95 )    (95% - 98% )      Pain reported at 9/23 11:30: 0 = None    Physical Exam by System:    Constitutional: lethargic   ENMT: Missing multiple teeth   Head/Neck: Supple   Respiratory/Thorax: Patent airways, CTAB, normal  breath sounds with good chest expansion, thorax symmetric   Cardiovascular: Regular, rate and rhythm, no murmurs,  2+ equal pulses of the extremities, normal S 1and S 2   Gastrointestinal: Nondistended, soft, non-tender,  no rebound tenderness or guarding, no masses palpable, no organomegaly, +BS, no bruits   Musculoskeletal: ROM intact, no joint swelling, normal  strength   Extremities: normal extremities, no cyanosis edema,  contusions or wounds, no clubbing   Neurological: Alert x1   Breast: not examined   Lymphatic: No significant lymphadenopathy   Skin: Warm and dry, no lesions, no rashes     Medication:    Medications:          Continuous Medications       --------------------------------  No continuous medications are active       Scheduled Medications       --------------------------------    1. amLODIPine (NORVASC):  5  mg  Oral  Daily    2. cefTRIAXone 1 gram/ Dextrose 5% IVPB Premixed Soln 50 mL:  50  mL  IntraVenous Piggyback  Every 24 Hours    3. Docusate Oral Liquid:  100  mg  Oral  2 Times a Day    4. Donepezil:  10  mg  Oral  At Bedtime    5. Heparin SubCutaneous:  5000  unit(s)  SubCutaneous  Every 8 Hours    6. Memantine:  10  mg  Oral  2 Times a Day    7. Mirtazapine:  7.5  mg  Oral  At Bedtime    8. Pantoprazole:   40  mg  Oral  Daily    9. Rosuvastatin:  10  mg  Oral  Daily         PRN Medications       --------------------------------    1. Acetaminophen:  650  mg  Oral  Every 4 Hours    2. Acetaminophen:  650  mg  Oral  Every 4 Hours    3. Dextromethorphan - guaiFENesin Oral Liquid:  5  mL  Oral  Every 4 Hours    4. Magnesium Hydroxide -Al Hydrox -Simethicone Oral Liquid:  30  mL  Oral  Every 6 Hours    5. Melatonin:  1.5  mg  Oral  At Bedtime    6. Ondansetron Injectable:  4  mg  IntraVenous Push  Every 4 Hours        Recent Lab Results:    Results:    CBC: 9/23/2023 06:56              \     Hgb     /                              \     13.2       /  WBC  ----------------  Plt               6.0       ----------------    283              /     Hct     \                              /     39.3       \            RBC: 4.57     MCV: 86           BMP: 9/23/2023 06:56  NA+        Cl-     BUN  /                         139    104    5 L /  --------------------------------  Glucose                ---------------------------  103 H    K+     HCO3-   Creat \                         3.5  27    0.53  \  Calcium : 8.6     Anion Gap : 12      Radiology Results:    Results:        Impression:    MRI Brain:     Motion limited exam, no acute intracranial pathology.     MRA:     Motion limited exam, no high-grade narrowing or occlusion in the head  or neck.     MACRO:  None     MRA Neck without Contrast [Sep 22 2023  7:14PM]      Impression:    MRI Brain:     Motion limited exam, no acute intracranial pathology.     MRA:     Motion limited exam, no high-grade narrowing or occlusion in the head  or neck.     MACRO:  None     MRA Head without Contrast [Sep 22 2023  7:14PM]      Impression:    MRI Brain:     Motion limited exam, no acute intracranial pathology.     MRA:     Motion limited exam, no high-grade narrowing or occlusion in the head  or neck.     MACRO:  None     MRI Brain without Contrast [Sep 22 2023  7:14PM]      Assessment  and Plan:   Code Status:  ·  Code Status Full Code     Assessment:    Acute encephalopathy secondary to UTI  Continue IV Rocephin  Repeat urinalysis is OK  Continued lethargy with poor oral intake  CT head negative  MRI negative  Will DC Rocephin as second cause confusion  She has completed the course of antibiotics regardless  Discussed with neurology    Dementia  Worsening  This could be her new baseline  Advised patient's spouse of the same    Hypertension  stable  continue current medications  will monitor and change dosage/medications during stay as needed    PT OT consulted  Patient's spouse wants to take her home with home health care    Severe malnutrition  Nutrition consult  Continue mirtazapine    Nutrition Diagnosis:  ·  Nutrition Diagnosis      Agree with dietitian?s assessment and diagnoses as stated.  A new diagnosis of Severe malnutrition related to chronic disease or condition related to  chronic illness  as evidence by moderate subcutaneous fat loss and moderate muscle wasting present.      Electronic Signatures:  Dalila Fernandez)  (Signed 23-Sep-2023 13:35)   Authored: Service, Subjective Data, Objective Data, Assessment  and Plan, Note Completion      Last Updated: 23-Sep-2023 13:35 by Dalila Fernandez)

## 2023-11-01 DIAGNOSIS — I10 PRIMARY HYPERTENSION: ICD-10-CM

## 2023-11-01 RX ORDER — AMLODIPINE BESYLATE 5 MG/1
5 TABLET ORAL DAILY
Qty: 90 TABLET | Refills: 1 | Status: SHIPPED | OUTPATIENT
Start: 2023-11-01 | End: 2024-05-20

## 2023-11-01 RX ORDER — AMLODIPINE BESYLATE 5 MG/1
5 TABLET ORAL DAILY
COMMUNITY
Start: 2023-10-21 | End: 2023-11-01 | Stop reason: SDUPTHER

## 2023-11-21 DIAGNOSIS — F32.0 DEPRESSION, MAJOR, SINGLE EPISODE, MILD (CMS-HCC): ICD-10-CM

## 2023-11-21 DIAGNOSIS — F02.818 DEMENTIA ASSOCIATED WITH OTHER UNDERLYING DISEASE WITH BEHAVIORAL DISTURBANCE (MULTI): ICD-10-CM

## 2023-11-21 DIAGNOSIS — I10 PRIMARY HYPERTENSION: ICD-10-CM

## 2023-11-27 RX ORDER — MEMANTINE HYDROCHLORIDE 10 MG/1
10 TABLET ORAL 2 TIMES DAILY
Qty: 90 TABLET | Refills: 0 | Status: SHIPPED | OUTPATIENT
Start: 2023-11-27

## 2024-01-01 DIAGNOSIS — F02.818 DEMENTIA ASSOCIATED WITH OTHER UNDERLYING DISEASE WITH BEHAVIORAL DISTURBANCE (MULTI): ICD-10-CM

## 2024-01-01 DIAGNOSIS — I10 PRIMARY HYPERTENSION: ICD-10-CM

## 2024-01-01 DIAGNOSIS — F32.0 DEPRESSION, MAJOR, SINGLE EPISODE, MILD (CMS-HCC): ICD-10-CM

## 2024-01-02 RX ORDER — ROSUVASTATIN CALCIUM 10 MG/1
10 TABLET, COATED ORAL DAILY
Qty: 90 TABLET | Refills: 0 | Status: SHIPPED | OUTPATIENT
Start: 2024-01-02 | End: 2024-03-11 | Stop reason: SDUPTHER

## 2024-01-02 RX ORDER — DONEPEZIL HYDROCHLORIDE 10 MG/1
10 TABLET, FILM COATED ORAL DAILY
Qty: 90 TABLET | Refills: 0 | Status: SHIPPED | OUTPATIENT
Start: 2024-01-02 | End: 2024-04-08

## 2024-03-03 DIAGNOSIS — F32.0 DEPRESSION, MAJOR, SINGLE EPISODE, MILD (CMS-HCC): ICD-10-CM

## 2024-03-03 DIAGNOSIS — I10 PRIMARY HYPERTENSION: ICD-10-CM

## 2024-03-03 DIAGNOSIS — F02.818 DEMENTIA ASSOCIATED WITH OTHER UNDERLYING DISEASE WITH BEHAVIORAL DISTURBANCE (MULTI): ICD-10-CM

## 2024-03-04 RX ORDER — MEMANTINE HYDROCHLORIDE 10 MG/1
10 TABLET ORAL 2 TIMES DAILY
Qty: 180 TABLET | Refills: 1 | Status: SHIPPED | OUTPATIENT
Start: 2024-03-04

## 2024-03-06 NOTE — CONSULTS
Service:   Consult:  Consult requested by (Attending Name): Dalila Fernandez   Reason: altered mental status, more drowsy/ lethargic     History of Present Illness:   History Present Illness:  HPI:    This is a 73-year-old woman with a history of dementia, hypertension, presenting for fever and found to have urinary tract infection.  For the UTI she was started  on Rocephin.  Subsequently she continued to stay on the floor however did not have much significant improvement and has been 3 days now.  Primary team is therefore concerned since neurology was consulted with putative diagnosis of acute metabolic encephalopathy  from UTI which is not improving despite antibiotics versus a possibility of new baseline.  MRI has been ordered but it is unremarkable for any etiology for this presentation. There is limitation due to movements. EEG is also recommended.    Review of system  As noted in HPI    Medical history: Dementia and hypertension.    Social history: Lives with the family her  is a pit caretaker.    Examination: Encephalopathic does not follow complex commands.  Would react to simple commands.  Gaze stable and in strict orientation no forced deviation.  No abnormal eye movements noted no abnormal facial movements noted face is symmetric.  Reacts  to noxious stimuli on the face. Reacts to noxious stimuli in both upper and both lower extremity proximally and distally.  Reflexes are symmetric but reduced.  No abnormal involuntary movements noted.           Allergies:  ·  No Known Allergies :     Objective:     Objective Information:        T   P  R  BP   MAP  SpO2   Value  36.6  74  18  175/84      98%  Date/Time 9/23 0:38 9/23 0:38 9/23 0:38 9/23 0:38    9/23 0:38  Range  (36C - 36.8C )  (66 - 108 )  (16 - 18 )  (113 - 175 )/ (79 - 95 )    (95% - 98% )    Medications:    Medications:          Continuous Medications       --------------------------------  No continuous medications are active        Scheduled Medications       --------------------------------    1. amLODIPine (NORVASC):  5  mg  Oral  Daily    2. cefTRIAXone 1 gram/ Dextrose 5% IVPB Premixed Soln 50 mL:  50  mL  IntraVenous Piggyback  Every 24 Hours    3. Docusate Oral Liquid:  100  mg  Oral  2 Times a Day    4. Donepezil:  10  mg  Oral  At Bedtime    5. Heparin SubCutaneous:  5000  unit(s)  SubCutaneous  Every 8 Hours    6. Memantine:  10  mg  Oral  2 Times a Day    7. Mirtazapine:  7.5  mg  Oral  At Bedtime    8. Pantoprazole:  40  mg  Oral  Daily    9. Rosuvastatin:  10  mg  Oral  Daily         PRN Medications       --------------------------------    1. Acetaminophen:  650  mg  Oral  Every 4 Hours    2. Acetaminophen:  650  mg  Oral  Every 4 Hours    3. Dextromethorphan - guaiFENesin Oral Liquid:  5  mL  Oral  Every 4 Hours    4. Magnesium Hydroxide -Al Hydrox -Simethicone Oral Liquid:  30  mL  Oral  Every 6 Hours    5. Melatonin:  1.5  mg  Oral  At Bedtime    6. Ondansetron Injectable:  4  mg  IntraVenous Push  Every 4 Hours        Recent Lab Results:    Results:        I have reviewed these laboratory results:    Complete Blood Count  22-Sep-2023 05:47:00      Result Value    White Blood Cell Count  8.8    Red Blood Cell Count  5.08    HGB  14.7    HCT  43.7    MCV  86    MCHC  33.6    PLT  304    RDW-CV  13.0      Basic Metabolic Panel  22-Sep-2023 05:47:00      Result Value    Glucose, Serum  121   H   NA  137    K  3.0   L   CL  101    Bicarbonate, Serum  27    Anion Gap, Serum  12    BUN  4   L   CREAT  0.53    GFR Female  >90    Calcium, Serum  8.8      Thyroid Stimulating Hormone, Serum  22-Sep-2023 05:47:00      Result Value    Thyroid Stimulating Hormone, Serum  2.55      Glucose_POCT  21-Sep-2023 14:45:00      Result Value    Glucose-POCT  94        Radiology Results:    Results:        Impression:    MRI Brain:     Motion limited exam, no acute intracranial pathology.     MRA:     Motion limited exam, no high-grade narrowing or  occlusion in the head  or neck.     MACRO:  None     MRA Neck without Contrast [Sep 22 2023  7:14PM]      Impression:    MRI Brain:     Motion limited exam, no acute intracranial pathology.     MRA:     Motion limited exam, no high-grade narrowing or occlusion in the head  or neck.     MACRO:  None     MRA Head without Contrast [Sep 22 2023  7:14PM]      Impression:    MRI Brain:     Motion limited exam, no acute intracranial pathology.     MRA:     Motion limited exam, no high-grade narrowing or occlusion in the head  or neck.     MACRO:  None     MRI Brain without Contrast [Sep 22 2023  7:14PM]      Impression:    1.  No noncontrast CT evidence of acute cortical ischemia, hemorrhage  or mass. If there is persistent clinical concern, MRI with  diffusion-weighted imaging may be obtained for further evaluation as  it is a more sensitive test for this indication.      CT Head without Contrast [Sep 22 2023  8:41AM]        Problem List:       Admitting Dx:   Encephalopathy:        Additional Dx:   Protein-calorie malnutrition:    Hypokalemia:    Alzheimer's dementia:    Dementia:    HTN (hypertension):    UTI (urinary tract infection):    Trauma:    Dehydration:    Scalp hematoma:    Fall in home:     Assessment/Recommendations:  Assessment:    This is a 73-year-old woman with a history of dementia now presenting for urinary tract infection and acute metabolic encephalopathy.  Despite 3 days of course of  antibiotics she is not improving.  MRI brain although motion degraded does not show any obvious abnormality to suggest this presentation.  Likely this is prolonged metabolic encephalopathy in setting of UTI and dementia however it is also possible that  antibiotics such as rocephin  might cause encephalopathy. Per  she is improving today compared to yesterday.  If possible consider switching to different antibiotics.  Suggest EEG routine study as soon as possible.  Please call us with any questions  we will  follow through the studies.  60 minutes spent in care of this patient.    Consult Status:  Consult Status    (select all that apply): initial  consult complete, will follow   Consult Order ID: [id number]       Electronic Signatures:  Shaikh, Aasef (MD)  (Signed 23-Sep-2023 10:59)   Authored: Service, History of Present Illness, Allergies,  Objective, Assessment/Recommendations, Note Completion      Last Updated: 23-Sep-2023 10:59 by Shaikh, Aasef (MD)

## 2024-03-08 RX ORDER — MEMANTINE HYDROCHLORIDE 10 MG/1
10 TABLET ORAL 2 TIMES DAILY
Qty: 30 TABLET | Refills: 0 | Status: SHIPPED | OUTPATIENT
Start: 2024-03-08

## 2024-03-08 RX ORDER — POTASSIUM CHLORIDE 1.5 G/1.58G
20 POWDER, FOR SOLUTION ORAL 3 TIMES DAILY
Qty: 30 PACKET | Refills: 0 | Status: SHIPPED | OUTPATIENT
Start: 2024-03-08 | End: 2024-05-20

## 2024-03-11 ENCOUNTER — OFFICE VISIT (OUTPATIENT)
Dept: PRIMARY CARE | Facility: CLINIC | Age: 74
End: 2024-03-11
Payer: MEDICARE

## 2024-03-11 DIAGNOSIS — F02.818 DEMENTIA ASSOCIATED WITH OTHER UNDERLYING DISEASE WITH BEHAVIORAL DISTURBANCE (MULTI): Primary | ICD-10-CM

## 2024-03-11 DIAGNOSIS — I10 PRIMARY HYPERTENSION: ICD-10-CM

## 2024-03-11 DIAGNOSIS — F32.0 DEPRESSION, MAJOR, SINGLE EPISODE, MILD (CMS-HCC): ICD-10-CM

## 2024-03-11 DIAGNOSIS — Z74.09 IMPAIRED FUNCTIONAL MOBILITY, BALANCE, GAIT, AND ENDURANCE: ICD-10-CM

## 2024-03-11 DIAGNOSIS — E43 SEVERE PROTEIN-CALORIE MALNUTRITION (MULTI): ICD-10-CM

## 2024-03-11 DIAGNOSIS — R30.0 DYSURIA: ICD-10-CM

## 2024-03-11 DIAGNOSIS — F22 PARANOID DELUSION (MULTI): ICD-10-CM

## 2024-03-11 DIAGNOSIS — E78.00 PURE HYPERCHOLESTEROLEMIA: ICD-10-CM

## 2024-03-11 LAB
POC APPEARANCE, URINE: CLEAR
POC BILIRUBIN, URINE: NEGATIVE
POC BLOOD, URINE: NEGATIVE
POC COLOR, URINE: YELLOW
POC GLUCOSE, URINE: NEGATIVE MG/DL
POC KETONES, URINE: ABNORMAL MG/DL
POC LEUKOCYTES, URINE: NEGATIVE
POC NITRITE,URINE: NEGATIVE
POC PH, URINE: 6.5 PH
POC PROTEIN, URINE: NEGATIVE MG/DL
POC SPECIFIC GRAVITY, URINE: 1.02
POC UROBILINOGEN, URINE: 1 EU/DL

## 2024-03-11 PROCEDURE — 3079F DIAST BP 80-89 MM HG: CPT | Performed by: FAMILY MEDICINE

## 2024-03-11 PROCEDURE — 81003 URINALYSIS AUTO W/O SCOPE: CPT

## 2024-03-11 PROCEDURE — 99214 OFFICE O/P EST MOD 30 MIN: CPT | Performed by: FAMILY MEDICINE

## 2024-03-11 PROCEDURE — 81002 URINALYSIS NONAUTO W/O SCOPE: CPT | Performed by: FAMILY MEDICINE

## 2024-03-11 PROCEDURE — 87086 URINE CULTURE/COLONY COUNT: CPT

## 2024-03-11 PROCEDURE — 1159F MED LIST DOCD IN RCRD: CPT | Performed by: FAMILY MEDICINE

## 2024-03-11 PROCEDURE — 1036F TOBACCO NON-USER: CPT | Performed by: FAMILY MEDICINE

## 2024-03-11 PROCEDURE — 3075F SYST BP GE 130 - 139MM HG: CPT | Performed by: FAMILY MEDICINE

## 2024-03-11 RX ORDER — ROSUVASTATIN CALCIUM 10 MG/1
10 TABLET, COATED ORAL DAILY
Qty: 90 TABLET | Refills: 0 | Status: SHIPPED | OUTPATIENT
Start: 2024-03-11

## 2024-03-11 ASSESSMENT — PATIENT HEALTH QUESTIONNAIRE - PHQ9: 1. LITTLE INTEREST OR PLEASURE IN DOING THINGS: NOT AT ALL

## 2024-03-11 ASSESSMENT — ENCOUNTER SYMPTOMS
DEPRESSION: 0
LOSS OF SENSATION IN FEET: 0
OCCASIONAL FEELINGS OF UNSTEADINESS: 0

## 2024-03-11 NOTE — PROGRESS NOTES
"Transport chair to [place]   Subjective   Patient ID: Juliann Bautista is a 73 y.o. female who presents for UTI.      UTI       Here fwith   Cognition worse  Concerend about UTI  BP elevated but better now  [place] statin    Current Outpatient Medications on File Prior to Visit   Medication Sig Dispense Refill    amLODIPine (Norvasc) 5 mg tablet Take 1 tablet (5 mg) by mouth once daily. 90 tablet 1    donepezil (Aricept) 10 mg tablet TAKE ONE TABLET BY MOUTH EVERY DAY 90 tablet 0    memantine (Namenda) 10 mg tablet Take 1 tablet (10 mg) by mouth 2 times a day. 90 tablet 0    memantine (Namenda) 10 mg tablet TAKE ONE TABLET BY MOUTH TWO TIMES A DAY 90 tablet 0    memantine (Namenda) 10 mg tablet TAKE ONE TABLET BY MOUTH TWO TIMES A  tablet 1    memantine (Namenda) 10 mg tablet Take 1 tablet (10 mg) by mouth 2 times a day. 30 tablet 0    MULTIVITAMIN ORAL Take 1 tablet by mouth once every day.      potassium chloride (Klor-Con) 20 mEq packet Take 20 mEq by mouth 3 times a day. 30 packet 0    [DISCONTINUED] rosuvastatin (Crestor) 10 mg tablet TAKE ONE TABLET BY MOUTH EVERY DAY 90 tablet 0     No current facility-administered medications on file prior to visit.        Review of Systems   Constitutional:  Positive for activity change, appetite change, fatigue and unexpected weight change.   HENT: Negative.     Respiratory: Negative.     Cardiovascular: Negative.    Gastrointestinal: Negative.    Genitourinary:  Positive for difficulty urinating and enuresis.   Musculoskeletal:  Positive for gait problem.   Psychiatric/Behavioral:  Positive for confusion.        Objective   Blood Pressure 134/88   Height 1.588 m (5' 2.5\")   Body Mass Index 18.18 kg/m²   BSA: 1.42 meters squared  Growth percentiles: Facility age limit for growth %rosalva is 20 years. Facility age limit for growth %rosalva is 20 years.   Office Visit on 03/11/2024   Component Date Value Ref Range Status    Color, Urine 03/11/2024 Yellow  Light-Yellow, " Yellow, Dark-Yellow Final    Appearance, Urine 03/11/2024 Clear  Clear Final    Specific Gravity, Urine 03/11/2024 1.018  1.005 - 1.035 Final    pH, Urine 03/11/2024 6.5  5.0, 5.5, 6.0, 6.5, 7.0, 7.5, 8.0 Final    Protein, Urine 03/11/2024 NEGATIVE  NEGATIVE, 10 (TRACE), 20 (TRACE) mg/dL Final    Glucose, Urine 03/11/2024 Normal  Normal mg/dL Final    Blood, Urine 03/11/2024 NEGATIVE  NEGATIVE Final    Ketones, Urine 03/11/2024 NEGATIVE  NEGATIVE mg/dL Final    Bilirubin, Urine 03/11/2024 NEGATIVE  NEGATIVE Final    Urobilinogen, Urine 03/11/2024 Normal  Normal mg/dL Final    Nitrite, Urine 03/11/2024 NEGATIVE  NEGATIVE Final    Leukocyte Esterase, Urine 03/11/2024 NEGATIVE  NEGATIVE Final    Urine Culture 03/11/2024 No significant growth   Final    POC Color, Urine 03/11/2024 Yellow  Straw, Yellow, Light-Yellow Final    POC Appearance, Urine 03/11/2024 Clear  Clear Final    POC Glucose, Urine 03/11/2024 NEGATIVE  NEGATIVE mg/dl Final    POC Bilirubin, Urine 03/11/2024 NEGATIVE  NEGATIVE Final    POC Ketones, Urine 03/11/2024 TRACE (A)  NEGATIVE mg/dl Final    POC Specific Gravity, Urine 03/11/2024 1.025  1.005 - 1.035 Final    POC Blood, Urine 03/11/2024 NEGATIVE  NEGATIVE Final    POC PH, Urine 03/11/2024 6.5  No Reference Range Established PH Final    POC Protein, Urine 03/11/2024 NEGATIVE  NEGATIVE, 30 (1+) mg/dl Final    POC Urobilinogen, Urine 03/11/2024 1.0  0.2, 1.0 EU/DL Final    Poc Nitrite, Urine 03/11/2024 NEGATIVE  NEGATIVE Final    POC Leukocytes, Urine 03/11/2024 NEGATIVE  NEGATIVE Final      Physical Exam  HENT:      Head: Normocephalic and atraumatic.   Cardiovascular:      Rate and Rhythm: Normal rate and regular rhythm.   Pulmonary:      Effort: Pulmonary effort is normal.      Breath sounds: Normal breath sounds.   Musculoskeletal:      Cervical back: Normal range of motion.   Skin:     General: Skin is warm and dry.   Neurological:      Mental Status: She is disoriented.      Gait: Gait  abnormal.         Assessment/Plan   Problem List Items Addressed This Visit             ICD-10-CM    Dementia associated with other underlying disease with behavioral disturbance (CMS/HCC) - Primary F02.818    Relevant Medications    rosuvastatin (Crestor) 10 mg tablet    Depression, major, single episode, mild (CMS/HCC) F32.0     Currently stable not on meds.          Relevant Medications    rosuvastatin (Crestor) 10 mg tablet    Hypertension I10     Blood pressure controlled continue medicationAmlodipine         Relevant Medications    rosuvastatin (Crestor) 10 mg tablet    Paranoid delusion (CMS/HCC) F22     Due to dementia  providing care.          Severe protein-calorie malnutrition (CMS/HCC) E43     Weight stable [place] intake.          Impaired functional mobility, balance, gait, and endurance Z74.09     Patient would benefit from a transport chair to complete ADLs withion the house where a cane or walker would not suffice due to dementia weakness risk of falls.          Hyperlipidemia, unspecified E78.5     C/w statin healthy diet limited activity         Dysuria R30.0    Relevant Orders    Urinalysis with Reflex Microscopic (Completed)    Urine Culture (Completed)    POCT UA (nonautomated) manually resulted (Completed)

## 2024-03-12 LAB
APPEARANCE UR: CLEAR
BACTERIA UR CULT: NORMAL
BILIRUB UR STRIP.AUTO-MCNC: NEGATIVE MG/DL
COLOR UR: YELLOW
GLUCOSE UR STRIP.AUTO-MCNC: NORMAL MG/DL
KETONES UR STRIP.AUTO-MCNC: NEGATIVE MG/DL
LEUKOCYTE ESTERASE UR QL STRIP.AUTO: NEGATIVE
NITRITE UR QL STRIP.AUTO: NEGATIVE
PH UR STRIP.AUTO: 6.5 [PH]
PROT UR STRIP.AUTO-MCNC: NEGATIVE MG/DL
RBC # UR STRIP.AUTO: NEGATIVE /UL
SP GR UR STRIP.AUTO: 1.02
UROBILINOGEN UR STRIP.AUTO-MCNC: NORMAL MG/DL

## 2024-03-17 VITALS — BODY MASS INDEX: 18.18 KG/M2 | HEIGHT: 63 IN | DIASTOLIC BLOOD PRESSURE: 88 MMHG | SYSTOLIC BLOOD PRESSURE: 134 MMHG

## 2024-03-17 PROBLEM — R30.0 DYSURIA: Status: ACTIVE | Noted: 2024-03-17

## 2024-03-17 ASSESSMENT — ENCOUNTER SYMPTOMS
GASTROINTESTINAL NEGATIVE: 1
CONFUSION: 1
UNEXPECTED WEIGHT CHANGE: 1
DIFFICULTY URINATING: 1
CARDIOVASCULAR NEGATIVE: 1
ACTIVITY CHANGE: 1
APPETITE CHANGE: 1
FATIGUE: 1
RESPIRATORY NEGATIVE: 1

## 2024-03-18 NOTE — ASSESSMENT & PLAN NOTE
Patient would benefit from a transport chair to complete ADLs withion the house where a cane or walker would not suffice due to dementia weakness risk of falls.

## 2024-04-04 DIAGNOSIS — I10 PRIMARY HYPERTENSION: ICD-10-CM

## 2024-04-04 DIAGNOSIS — F02.818 DEMENTIA ASSOCIATED WITH OTHER UNDERLYING DISEASE WITH BEHAVIORAL DISTURBANCE (MULTI): ICD-10-CM

## 2024-04-04 DIAGNOSIS — F32.0 DEPRESSION, MAJOR, SINGLE EPISODE, MILD (CMS-HCC): ICD-10-CM

## 2024-04-08 DIAGNOSIS — F02.818 DEMENTIA ASSOCIATED WITH OTHER UNDERLYING DISEASE WITH BEHAVIORAL DISTURBANCE (MULTI): ICD-10-CM

## 2024-04-08 DIAGNOSIS — F22 PARANOID DELUSION (MULTI): ICD-10-CM

## 2024-04-08 DIAGNOSIS — G30.9 ALZHEIMER'S DISEASE (MULTI): ICD-10-CM

## 2024-04-08 DIAGNOSIS — F32.0 DEPRESSION, MAJOR, SINGLE EPISODE, MILD (CMS-HCC): ICD-10-CM

## 2024-04-08 DIAGNOSIS — I10 PRIMARY HYPERTENSION: ICD-10-CM

## 2024-04-08 DIAGNOSIS — F02.80 ALZHEIMER'S DISEASE (MULTI): ICD-10-CM

## 2024-04-08 DIAGNOSIS — R26.9 GAIT ABNORMALITY: ICD-10-CM

## 2024-04-08 RX ORDER — DONEPEZIL HYDROCHLORIDE 10 MG/1
10 TABLET, FILM COATED ORAL DAILY
Qty: 90 TABLET | Refills: 0 | Status: SHIPPED | OUTPATIENT
Start: 2024-04-08

## 2024-04-08 RX ORDER — DONEPEZIL HYDROCHLORIDE 10 MG/1
10 TABLET, FILM COATED ORAL DAILY
Qty: 90 TABLET | Refills: 0 | Status: SHIPPED | OUTPATIENT
Start: 2024-04-08 | End: 2024-04-08 | Stop reason: SDUPTHER

## 2024-05-20 DIAGNOSIS — I10 PRIMARY HYPERTENSION: ICD-10-CM

## 2024-05-20 RX ORDER — POTASSIUM CHLORIDE 1.5 G/1.58G
POWDER, FOR SOLUTION ORAL
Qty: 270 PACKET | Refills: 1 | Status: SHIPPED | OUTPATIENT
Start: 2024-05-20

## 2024-05-20 RX ORDER — AMLODIPINE BESYLATE 5 MG/1
5 TABLET ORAL DAILY
Qty: 90 TABLET | Refills: 1 | Status: SHIPPED | OUTPATIENT
Start: 2024-05-20

## 2024-07-10 DIAGNOSIS — I10 PRIMARY HYPERTENSION: ICD-10-CM

## 2024-07-10 DIAGNOSIS — F32.0 DEPRESSION, MAJOR, SINGLE EPISODE, MILD (CMS-HCC): ICD-10-CM

## 2024-07-10 DIAGNOSIS — F02.818 DEMENTIA ASSOCIATED WITH OTHER UNDERLYING DISEASE WITH BEHAVIORAL DISTURBANCE (MULTI): ICD-10-CM

## 2024-07-10 RX ORDER — ROSUVASTATIN CALCIUM 10 MG/1
10 TABLET, COATED ORAL DAILY
Qty: 90 TABLET | Refills: 0 | Status: SHIPPED | OUTPATIENT
Start: 2024-07-10

## 2024-07-10 RX ORDER — DONEPEZIL HYDROCHLORIDE 10 MG/1
10 TABLET, FILM COATED ORAL DAILY
Qty: 90 TABLET | Refills: 0 | Status: SHIPPED | OUTPATIENT
Start: 2024-07-10

## 2024-07-30 DIAGNOSIS — N30.00 ACUTE CYSTITIS WITHOUT HEMATURIA: Primary | ICD-10-CM

## 2024-08-01 ENCOUNTER — TELEPHONE (OUTPATIENT)
Dept: PRIMARY CARE | Facility: CLINIC | Age: 74
End: 2024-08-01
Payer: MEDICARE

## 2024-08-01 NOTE — TELEPHONE ENCOUNTER
Orders for urine culture has not been done per the lab. Do you want me to have her  bring another urine?

## 2024-08-02 DIAGNOSIS — N30.00 ACUTE CYSTITIS WITHOUT HEMATURIA: Primary | ICD-10-CM

## 2024-08-08 ENCOUNTER — CLINICAL SUPPORT (OUTPATIENT)
Dept: PRIMARY CARE | Facility: CLINIC | Age: 74
End: 2024-08-08
Payer: MEDICARE

## 2024-08-08 DIAGNOSIS — N30.00 ACUTE CYSTITIS WITHOUT HEMATURIA: ICD-10-CM

## 2024-08-08 LAB
APPEARANCE UR: CLEAR
BILIRUB UR STRIP.AUTO-MCNC: NEGATIVE MG/DL
CAOX CRY #/AREA UR COMP ASSIST: ABNORMAL /HPF
COLOR UR: ABNORMAL
GLUCOSE UR STRIP.AUTO-MCNC: NORMAL MG/DL
KETONES UR STRIP.AUTO-MCNC: NEGATIVE MG/DL
LEUKOCYTE ESTERASE UR QL STRIP.AUTO: ABNORMAL
MUCOUS THREADS #/AREA URNS AUTO: ABNORMAL /LPF
NITRITE UR QL STRIP.AUTO: NEGATIVE
PH UR STRIP.AUTO: 8 [PH]
PROT UR STRIP.AUTO-MCNC: NEGATIVE MG/DL
RBC # UR STRIP.AUTO: NEGATIVE /UL
RBC #/AREA URNS AUTO: ABNORMAL /HPF
SP GR UR STRIP.AUTO: 1.01
SQUAMOUS #/AREA URNS AUTO: ABNORMAL /HPF
TRANS CELLS #/AREA UR COMP ASSIST: ABNORMAL /HPF
UROBILINOGEN UR STRIP.AUTO-MCNC: NORMAL MG/DL
WBC #/AREA URNS AUTO: ABNORMAL /HPF

## 2024-08-08 PROCEDURE — 81001 URINALYSIS AUTO W/SCOPE: CPT

## 2024-08-08 PROCEDURE — 87086 URINE CULTURE/COLONY COUNT: CPT

## 2024-08-10 LAB — BACTERIA UR CULT: NORMAL

## 2024-09-18 DIAGNOSIS — F02.818 DEMENTIA ASSOCIATED WITH OTHER UNDERLYING DISEASE WITH BEHAVIORAL DISTURBANCE (MULTI): ICD-10-CM

## 2024-09-18 DIAGNOSIS — F32.0 DEPRESSION, MAJOR, SINGLE EPISODE, MILD (CMS-HCC): ICD-10-CM

## 2024-09-18 DIAGNOSIS — I10 PRIMARY HYPERTENSION: ICD-10-CM

## 2024-09-18 RX ORDER — MEMANTINE HYDROCHLORIDE 10 MG/1
10 TABLET ORAL 2 TIMES DAILY
Qty: 30 TABLET | Refills: 0 | Status: SHIPPED | OUTPATIENT
Start: 2024-09-18

## 2024-10-07 DIAGNOSIS — F02.818 DEMENTIA ASSOCIATED WITH OTHER UNDERLYING DISEASE WITH BEHAVIORAL DISTURBANCE (MULTI): ICD-10-CM

## 2024-10-07 DIAGNOSIS — I10 PRIMARY HYPERTENSION: ICD-10-CM

## 2024-10-07 DIAGNOSIS — F32.0 DEPRESSION, MAJOR, SINGLE EPISODE, MILD (CMS-HCC): ICD-10-CM

## 2024-10-07 RX ORDER — ROSUVASTATIN CALCIUM 10 MG/1
10 TABLET, COATED ORAL DAILY
Qty: 30 TABLET | Refills: 0 | Status: SHIPPED | OUTPATIENT
Start: 2024-10-07

## 2024-10-07 RX ORDER — DONEPEZIL HYDROCHLORIDE 10 MG/1
10 TABLET, FILM COATED ORAL DAILY
Qty: 30 TABLET | Refills: 0 | Status: SHIPPED | OUTPATIENT
Start: 2024-10-07

## 2024-10-07 RX ORDER — MEMANTINE HYDROCHLORIDE 10 MG/1
10 TABLET ORAL 2 TIMES DAILY
Qty: 60 TABLET | Refills: 0 | Status: SHIPPED | OUTPATIENT
Start: 2024-10-07

## 2024-10-07 NOTE — TELEPHONE ENCOUNTER
Medication: donepezil (Aricept)   Dose:10 mg tablet   Frequency: Take 1 tablet (10 mg) by mouth once daily.   Quantity left: 0        Medication: memantine (Namenda)   Dose: 10 mg tablet   Frequency: TAKE ONE TABLET BY MOUTH TWO TIMES A DAY   Quantity left: 0         Medication: Rosuvastatin (Crestor)   Dose: 10 mg tablet   Frequency: TAKE ONE TABLET BY MOUTH EVERY DAY   Quantity left: 0   Pharmacy: [id number] - [place], [place]   Last appointment: 03/11/2024  Next appointment: 11/07/2024

## 2024-11-07 ENCOUNTER — APPOINTMENT (OUTPATIENT)
Dept: PRIMARY CARE | Facility: CLINIC | Age: 74
End: 2024-11-07
Payer: MEDICARE

## 2024-11-07 DIAGNOSIS — F32.0 DEPRESSION, MAJOR, SINGLE EPISODE, MILD (CMS-HCC): ICD-10-CM

## 2024-11-07 DIAGNOSIS — I10 PRIMARY HYPERTENSION: ICD-10-CM

## 2024-11-07 DIAGNOSIS — F02.818 DEMENTIA ASSOCIATED WITH OTHER UNDERLYING DISEASE WITH BEHAVIORAL DISTURBANCE (MULTI): ICD-10-CM

## 2024-11-07 DIAGNOSIS — Z00.00 ROUTINE GENERAL MEDICAL EXAMINATION AT A HEALTH CARE FACILITY: ICD-10-CM

## 2024-11-07 DIAGNOSIS — Z00.00 ROUTINE GENERAL MEDICAL EXAMINATION AT HEALTH CARE FACILITY: Primary | ICD-10-CM

## 2024-11-07 DIAGNOSIS — E78.00 PURE HYPERCHOLESTEROLEMIA: ICD-10-CM

## 2024-11-07 PROCEDURE — G0008 ADMIN INFLUENZA VIRUS VAC: HCPCS | Performed by: FAMILY MEDICINE

## 2024-11-07 PROCEDURE — 1170F FXNL STATUS ASSESSED: CPT | Performed by: FAMILY MEDICINE

## 2024-11-07 PROCEDURE — G0444 DEPRESSION SCREEN ANNUAL: HCPCS | Performed by: FAMILY MEDICINE

## 2024-11-07 PROCEDURE — 90656 IIV3 VACC NO PRSV 0.5 ML IM: CPT | Performed by: FAMILY MEDICINE

## 2024-11-07 PROCEDURE — 1124F ACP DISCUSS-NO DSCNMKR DOCD: CPT | Performed by: FAMILY MEDICINE

## 2024-11-07 PROCEDURE — G0439 PPPS, SUBSEQ VISIT: HCPCS | Performed by: FAMILY MEDICINE

## 2024-11-07 PROCEDURE — 99214 OFFICE O/P EST MOD 30 MIN: CPT | Performed by: FAMILY MEDICINE

## 2024-11-07 RX ORDER — MEMANTINE HYDROCHLORIDE 10 MG/1
10 TABLET ORAL 2 TIMES DAILY
Qty: 180 TABLET | Refills: 1 | Status: SHIPPED | OUTPATIENT
Start: 2024-11-07 | End: 2024-11-10 | Stop reason: SDUPTHER

## 2024-11-07 RX ORDER — ROSUVASTATIN CALCIUM 10 MG/1
10 TABLET, COATED ORAL DAILY
Qty: 90 TABLET | Refills: 1 | Status: SHIPPED | OUTPATIENT
Start: 2024-11-07

## 2024-11-07 RX ORDER — DONEPEZIL HYDROCHLORIDE 10 MG/1
10 TABLET, FILM COATED ORAL DAILY
Qty: 90 TABLET | Refills: 1 | Status: SHIPPED | OUTPATIENT
Start: 2024-11-07

## 2024-11-07 ASSESSMENT — ACTIVITIES OF DAILY LIVING (ADL)
GROCERY_SHOPPING: TOTAL CARE
DOING_HOUSEWORK: TOTAL CARE
MANAGING_FINANCES: TOTAL CARE
TAKING_MEDICATION: TOTAL CARE
DRESSING: DEPENDENT
BATHING: DEPENDENT

## 2024-11-07 ASSESSMENT — ENCOUNTER SYMPTOMS
DEPRESSION: 0
OCCASIONAL FEELINGS OF UNSTEADINESS: 0
LOSS OF SENSATION IN FEET: 0

## 2024-11-07 ASSESSMENT — PATIENT HEALTH QUESTIONNAIRE - PHQ9
2. FEELING DOWN, DEPRESSED OR HOPELESS: NOT AT ALL
1. LITTLE INTEREST OR PLEASURE IN DOING THINGS: NOT AT ALL
SUM OF ALL RESPONSES TO PHQ9 QUESTIONS 1 AND 2: 0

## 2024-11-10 PROBLEM — Z00.00 ROUTINE GENERAL MEDICAL EXAMINATION AT HEALTH CARE FACILITY: Status: ACTIVE | Noted: 2024-11-10

## 2024-11-10 PROBLEM — Z00.00 ROUTINE GENERAL MEDICAL EXAMINATION AT A HEALTH CARE FACILITY: Status: ACTIVE | Noted: 2024-11-10

## 2024-11-10 ASSESSMENT — ENCOUNTER SYMPTOMS
RESPIRATORY NEGATIVE: 1
CARDIOVASCULAR NEGATIVE: 1
DECREASED CONCENTRATION: 1
APPETITE CHANGE: 1
NERVOUS/ANXIOUS: 1
ACTIVITY CHANGE: 1
GASTROINTESTINAL NEGATIVE: 1
FATIGUE: 1
SLEEP DISTURBANCE: 1
SPEECH DIFFICULTY: 1
CONFUSION: 1

## 2024-11-10 NOTE — PROGRESS NOTES
Subjective   Reason for Visit: Juliann Bautista is an 74 y.o. female here for a Medicare Wellness visit.               HPI  Patient is here for follow-up of hypertension hyperlipidemia has been taking meds as prescribed denies chest pain shortness of breath current allergies myalgias dizziness been eating healthy and trying to increase exercise  Dementia progressive tolearting meds  providing 24/7 care  No flals  No UTI    Patient Care Team:  Andrew Cagle MD as PCP - General     Review of Systems   Constitutional:  Positive for activity change, appetite change and fatigue.   HENT: Negative.     Respiratory: Negative.     Cardiovascular: Negative.    Gastrointestinal: Negative.    Musculoskeletal:  Positive for gait problem.   Neurological:  Positive for speech difficulty.   Psychiatric/Behavioral:  Positive for behavioral problems, confusion, decreased concentration and sleep disturbance. The patient is nervous/anxious.        Objective   Vitals:  There were no vitals taken for this visit.      Physical Exam  HENT:      Head: Normocephalic.      Right Ear: Tympanic membrane normal.      Left Ear: Tympanic membrane normal.   Cardiovascular:      Rate and Rhythm: Normal rate and regular rhythm.   Neurological:      Mental Status: She is disoriented.         Assessment & Plan  Dementia associated with other underlying disease with behavioral disturbance (Multi)   provides care 24/7 c/w aricept and namenda    Orders:    donepezil (Aricept) 10 mg tablet; Take 1 tablet (10 mg) by mouth once daily.    rosuvastatin (Crestor) 10 mg tablet; Take 1 tablet (10 mg) by mouth once daily.    Depression, major, single episode, mild (CMS-HCC)  Currently stable not on meds.     Orders:    donepezil (Aricept) 10 mg tablet; Take 1 tablet (10 mg) by mouth once daily.    rosuvastatin (Crestor) 10 mg tablet; Take 1 tablet (10 mg) by mouth once daily.    Primary hypertension  Blood pressure controlled continue  "medicationAmlodipine    Orders:    donepezil (Aricept) 10 mg tablet; Take 1 tablet (10 mg) by mouth once daily.    rosuvastatin (Crestor) 10 mg tablet; Take 1 tablet (10 mg) by mouth once daily.    Pure hypercholesterolemia  C/w statin healthy diet limited activity         Routine general medical examination at a health care facility         Routine general medical examination at health care facility    Orders:    1 Year Follow Up In Primary Care - Wellness Exam; Future        spent 15 minutes obtaining and discussing depression screening using PHQ-2 questions with results documented in chart.”  (If screen positive: \"The screen indicated potential depression and PHQ-9 was obtained with treatment and referral plan discussed      Depression Screening  more than 15 minutes were spent screening for depression.     "

## 2024-11-10 NOTE — ASSESSMENT & PLAN NOTE
Blood pressure controlled continue medicationAmlodipine    Orders:    donepezil (Aricept) 10 mg tablet; Take 1 tablet (10 mg) by mouth once daily.    rosuvastatin (Crestor) 10 mg tablet; Take 1 tablet (10 mg) by mouth once daily.

## 2024-11-10 NOTE — ASSESSMENT & PLAN NOTE
Currently stable not on meds.     Orders:    donepezil (Aricept) 10 mg tablet; Take 1 tablet (10 mg) by mouth once daily.    rosuvastatin (Crestor) 10 mg tablet; Take 1 tablet (10 mg) by mouth once daily.

## 2024-11-10 NOTE — ASSESSMENT & PLAN NOTE
provides care 24/7 c/w aricept and namenda    Orders:    donepezil (Aricept) 10 mg tablet; Take 1 tablet (10 mg) by mouth once daily.    rosuvastatin (Crestor) 10 mg tablet; Take 1 tablet (10 mg) by mouth once daily.

## 2024-11-11 DIAGNOSIS — I10 PRIMARY HYPERTENSION: ICD-10-CM

## 2024-11-11 DIAGNOSIS — F02.818 DEMENTIA ASSOCIATED WITH OTHER UNDERLYING DISEASE WITH BEHAVIORAL DISTURBANCE (MULTI): ICD-10-CM

## 2024-11-11 DIAGNOSIS — F32.0 DEPRESSION, MAJOR, SINGLE EPISODE, MILD (CMS-HCC): ICD-10-CM

## 2024-11-11 RX ORDER — MEMANTINE HYDROCHLORIDE 10 MG/1
10 TABLET ORAL 2 TIMES DAILY
Qty: 180 TABLET | Refills: 2 | Status: SHIPPED | OUTPATIENT
Start: 2024-11-11

## 2025-02-05 DIAGNOSIS — I10 PRIMARY HYPERTENSION: ICD-10-CM

## 2025-02-05 RX ORDER — AMLODIPINE BESYLATE 5 MG/1
5 TABLET ORAL DAILY
Qty: 90 TABLET | Refills: 0 | Status: SHIPPED | OUTPATIENT
Start: 2025-02-05

## 2025-02-18 ENCOUNTER — TELEPHONE (OUTPATIENT)
Dept: PRIMARY CARE | Facility: CLINIC | Age: 75
End: 2025-02-18
Payer: MEDICARE

## 2025-02-18 NOTE — TELEPHONE ENCOUNTER
wants to check if wife has an UTI . He will be coming up here for an urine cup Sometime this week .

## 2025-02-25 ENCOUNTER — CLINICAL SUPPORT (OUTPATIENT)
Dept: PRIMARY CARE | Facility: CLINIC | Age: 75
End: 2025-02-25
Payer: MEDICARE

## 2025-02-25 VITALS — BODY MASS INDEX: 18.47 KG/M2 | HEIGHT: 62 IN

## 2025-02-25 DIAGNOSIS — R30.0 DYSURIA: Primary | ICD-10-CM

## 2025-03-02 LAB — BACTERIA UR CULT: NORMAL

## 2025-03-05 DIAGNOSIS — N30.00 ACUTE CYSTITIS WITHOUT HEMATURIA: Primary | ICD-10-CM

## 2025-03-05 RX ORDER — NITROFURANTOIN 25; 75 MG/1; MG/1
100 CAPSULE ORAL 2 TIMES DAILY
Qty: 14 CAPSULE | Refills: 0 | Status: SHIPPED | OUTPATIENT
Start: 2025-03-05 | End: 2025-03-12

## 2025-03-06 ENCOUNTER — TELEPHONE (OUTPATIENT)
Dept: PRIMARY CARE | Facility: CLINIC | Age: 75
End: 2025-03-06

## 2025-03-06 ENCOUNTER — CLINICAL SUPPORT (OUTPATIENT)
Dept: PRIMARY CARE | Facility: CLINIC | Age: 75
End: 2025-03-06
Payer: MEDICARE

## 2025-03-06 VITALS — BODY MASS INDEX: 18.47 KG/M2 | HEIGHT: 62 IN

## 2025-03-06 DIAGNOSIS — N30.00 ACUTE CYSTITIS WITHOUT HEMATURIA: Primary | ICD-10-CM

## 2025-03-06 RX ORDER — AMOXICILLIN AND CLAVULANATE POTASSIUM 875; 125 MG/1; MG/1
875 TABLET, FILM COATED ORAL 2 TIMES DAILY
Qty: 14 TABLET | Refills: 0 | Status: SHIPPED | OUTPATIENT
Start: 2025-03-06 | End: 2025-03-13

## 2025-03-06 ASSESSMENT — ENCOUNTER SYMPTOMS
OCCASIONAL FEELINGS OF UNSTEADINESS: 0
DEPRESSION: 0
LOSS OF SENSATION IN FEET: 0

## 2025-03-06 ASSESSMENT — PATIENT HEALTH QUESTIONNAIRE - PHQ9
1. LITTLE INTEREST OR PLEASURE IN DOING THINGS: NOT AT ALL
SUM OF ALL RESPONSES TO PHQ9 QUESTIONS 1 AND 2: 0
2. FEELING DOWN, DEPRESSED OR HOPELESS: NOT AT ALL

## 2025-03-06 NOTE — TELEPHONE ENCOUNTER
Patient spouse called and stated antibiotic was sent in yesterday and patient unfortunately cannot swallow capsules. Patient spouse stated that he crushes the pills in applesauce for her and is requesting a tablet form of the antibiotic.

## 2025-03-08 LAB — BACTERIA UR CULT: NORMAL

## 2025-03-11 LAB — COLOR UR: NORMAL

## 2025-04-10 ENCOUNTER — APPOINTMENT (OUTPATIENT)
Dept: PRIMARY CARE | Facility: CLINIC | Age: 75
End: 2025-04-10
Payer: MEDICARE

## 2025-04-10 VITALS — SYSTOLIC BLOOD PRESSURE: 125 MMHG | HEART RATE: 92 BPM | DIASTOLIC BLOOD PRESSURE: 82 MMHG

## 2025-04-10 DIAGNOSIS — F02.818 DEMENTIA ASSOCIATED WITH OTHER UNDERLYING DISEASE WITH BEHAVIORAL DISTURBANCE: Primary | ICD-10-CM

## 2025-04-10 DIAGNOSIS — E78.00 PURE HYPERCHOLESTEROLEMIA: ICD-10-CM

## 2025-04-10 DIAGNOSIS — I15.1 HYPERTENSION SECONDARY TO OTHER RENAL DISORDERS: ICD-10-CM

## 2025-04-10 DIAGNOSIS — E43 SEVERE PROTEIN-CALORIE MALNUTRITION (GOMEZ: LESS THAN 60% OF STANDARD WEIGHT) (MULTI): ICD-10-CM

## 2025-04-10 PROBLEM — N39.0 URINARY TRACT INFECTION: Status: ACTIVE | Noted: 2023-09-27

## 2025-04-10 PROBLEM — R40.4 ALTERATION IN CONSCIOUSNESS DESCRIBED BY PATIENT AS FUNNY TURN"": Status: ACTIVE | Noted: 2025-04-10
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
## 2025-04-10 PROBLEM — R55 SYNCOPE: Status: ACTIVE | Noted: 2025-04-10

## 2025-04-10 PROBLEM — R47.01 APHASIA: Status: ACTIVE | Noted: 2023-09-27

## 2025-04-10 PROBLEM — T14.90XA TRAUMATIC INJURY: Status: ACTIVE | Noted: 2025-04-10

## 2025-04-10 PROBLEM — S00.03XA HEMATOMA OF SCALP: Status: ACTIVE | Noted: 2025-04-10

## 2025-04-10 ASSESSMENT — ENCOUNTER SYMPTOMS
LOSS OF SENSATION IN FEET: 0
DEPRESSION: 0
OCCASIONAL FEELINGS OF UNSTEADINESS: 0

## 2025-04-12 ASSESSMENT — ENCOUNTER SYMPTOMS
DECREASED CONCENTRATION: 1
FATIGUE: 1
RESPIRATORY NEGATIVE: 1
CONFUSION: 1
GASTROINTESTINAL NEGATIVE: 1
CARDIOVASCULAR NEGATIVE: 1
SLEEP DISTURBANCE: 1
SPEECH DIFFICULTY: 1
NERVOUS/ANXIOUS: 1
ACTIVITY CHANGE: 1
APPETITE CHANGE: 1

## 2025-04-13 NOTE — PROGRESS NOTES
Subjective   Reason for Visit: Juliann Bautista is an 74 y.o. female here forfollow up    Past Medical, Surgical, and Family History reviewed and updated in chart.    Reviewed all medications by prescribing practitioner or clinical pharmacist (such as prescriptions, OTCs, herbal therapies and supplements) and documented in the medical record.    HPI  Patient is here for follow-up of hypertension hyperlipidemia has been taking meds as prescribed denies chest pain shortness of breath current allergies myalgias dizziness been eating healthy and trying to increase exercise  Dementia progressive tolearting meds  providing 24/7 care  No flals  No UTI    Patient Care Team:  Andrew Cagle MD as PCP - General     Review of Systems   Constitutional:  Positive for activity change, appetite change and fatigue.   HENT: Negative.     Respiratory: Negative.     Cardiovascular: Negative.    Gastrointestinal: Negative.    Musculoskeletal:  Positive for gait problem.   Neurological:  Positive for speech difficulty.   Psychiatric/Behavioral:  Positive for behavioral problems, confusion, decreased concentration and sleep disturbance. The patient is nervous/anxious.        Objective   Vitals:  /82   Pulse 92       Physical Exam  HENT:      Head: Normocephalic.      Right Ear: Tympanic membrane normal.      Left Ear: Tympanic membrane normal.   Cardiovascular:      Rate and Rhythm: Normal rate and regular rhythm.   Neurological:      Mental Status: She is disoriented.         Assessment & Plan  Hypertension secondary to other renal disorders  Blood pressure controlled continue medicationAmlodipine           Pure hypercholesterolemia  C/w statin healthy diet limited activity           Severe protein-calorie malnutrition ([place]: less than 60% of standard weight) (Multi)  Encourage protein         Dementia associated with other underlying disease with behavioral disturbance   provides care 24/7 c/w aricept and  "jess               spent 15 minutes obtaining and discussing depression screening using PHQ-2 questions with results documented in chart.”  (If screen positive: \"The screen indicated potential depression and PHQ-9 was obtained with treatment and referral plan discussed      Depression Screening  more than 15 minutes were spent screening for depression.     "

## 2025-05-07 DIAGNOSIS — F32.0 DEPRESSION, MAJOR, SINGLE EPISODE, MILD (CMS-HCC): ICD-10-CM

## 2025-05-07 DIAGNOSIS — I10 PRIMARY HYPERTENSION: ICD-10-CM

## 2025-05-07 DIAGNOSIS — F02.818 DEMENTIA ASSOCIATED WITH OTHER UNDERLYING DISEASE WITH BEHAVIORAL DISTURBANCE: ICD-10-CM

## 2025-05-07 RX ORDER — AMLODIPINE BESYLATE 5 MG/1
5 TABLET ORAL DAILY
Qty: 90 TABLET | Refills: 0 | Status: SHIPPED | OUTPATIENT
Start: 2025-05-07

## 2025-05-07 RX ORDER — DONEPEZIL HYDROCHLORIDE 10 MG/1
10 TABLET, FILM COATED ORAL DAILY
Qty: 90 TABLET | Refills: 0 | Status: SHIPPED | OUTPATIENT
Start: 2025-05-07

## 2025-05-07 RX ORDER — ROSUVASTATIN CALCIUM 10 MG/1
10 TABLET, COATED ORAL DAILY
Qty: 90 TABLET | Refills: 0 | Status: SHIPPED | OUTPATIENT
Start: 2025-05-07

## 2025-06-16 DIAGNOSIS — I10 PRIMARY HYPERTENSION: ICD-10-CM

## 2025-06-16 RX ORDER — POTASSIUM CHLORIDE 1.5 G/1.58G
POWDER, FOR SOLUTION ORAL
Qty: 270 PACKET | Refills: 0 | Status: SHIPPED | OUTPATIENT
Start: 2025-06-16

## 2025-08-05 DIAGNOSIS — I10 PRIMARY HYPERTENSION: ICD-10-CM

## 2025-08-05 DIAGNOSIS — F32.0 DEPRESSION, MAJOR, SINGLE EPISODE, MILD: ICD-10-CM

## 2025-08-05 DIAGNOSIS — F02.818 DEMENTIA ASSOCIATED WITH OTHER UNDERLYING DISEASE WITH BEHAVIORAL DISTURBANCE: ICD-10-CM

## 2025-08-05 RX ORDER — MEMANTINE HYDROCHLORIDE 10 MG/1
10 TABLET ORAL 2 TIMES DAILY
Qty: 180 TABLET | Refills: 0 | Status: SHIPPED | OUTPATIENT
Start: 2025-08-05

## 2025-08-05 RX ORDER — DONEPEZIL HYDROCHLORIDE 10 MG/1
10 TABLET, FILM COATED ORAL DAILY
Qty: 90 TABLET | Refills: 0 | Status: SHIPPED | OUTPATIENT
Start: 2025-08-05

## 2025-08-05 RX ORDER — ROSUVASTATIN CALCIUM 10 MG/1
10 TABLET, COATED ORAL DAILY
Qty: 90 TABLET | Refills: 0 | Status: SHIPPED | OUTPATIENT
Start: 2025-08-05

## 2025-10-10 ENCOUNTER — APPOINTMENT (OUTPATIENT)
Dept: PRIMARY CARE | Facility: CLINIC | Age: 75
End: 2025-10-10
Payer: MEDICARE